# Patient Record
Sex: MALE | Race: WHITE | NOT HISPANIC OR LATINO | Employment: OTHER | URBAN - METROPOLITAN AREA
[De-identification: names, ages, dates, MRNs, and addresses within clinical notes are randomized per-mention and may not be internally consistent; named-entity substitution may affect disease eponyms.]

---

## 2018-07-12 ENCOUNTER — HOSPITAL ENCOUNTER (OUTPATIENT)
Dept: LAB | Facility: HOSPITAL | Age: 68
Discharge: HOME | End: 2018-07-12
Attending: INTERNAL MEDICINE | Admitting: INTERNAL MEDICINE

## 2018-07-12 LAB
ANION GAP SERPL CALC-SCNC: 11.2 MMOL/L (ref 10–20)
BASOPHILS # BLD AUTO: 0.1 10*3/UL (ref 0–0.2)
BASOPHILS NFR BLD AUTO: 1 % (ref 0–2)
BUN SERPL-MCNC: 20 MG/DL (ref 8–20)
BUN/CREAT SERPL: 20 (ref 6.2–20.3)
CALCIUM SERPL-MCNC: 9.8 MG/DL (ref 8.9–10.3)
CHLORIDE SERPL-SCNC: 106 MMOL/L (ref 101–111)
CHOLEST SERPL-MCNC: 163 MG/DL
CHOLEST/HDLC SERPL: 3.3 {RATIO}
CONV CO2: 27 MMOL/L (ref 22–32)
CONV LDL CHOLESTEROL DIRECT: 92 MG/DL (ref 0–100)
CREAT UR-MCNC: 1 MG/DL (ref 0.7–1.2)
DIFFERENTIAL METHOD BLD: (no result)
EOSINOPHIL # BLD AUTO: 0.1 10*3/UL (ref 0–0.3)
EOSINOPHIL # BLD AUTO: 2 % (ref 0–3)
ERYTHROCYTE [DISTWIDTH] IN BLOOD BY AUTOMATED COUNT: 12.7 % (ref 11.5–14.5)
GLUCOSE SERPL-MCNC: 123 MG/DL (ref 65–99)
HCT VFR BLD AUTO: 45.1 % (ref 40–54)
HDLC SERPL-MCNC: 49 MG/DL
HGB BLD-MCNC: 15 G/DL (ref 14–18)
LDLC/HDLC SERPL: 1.9 {RATIO}
LIPID INTERPRETATION: ABNORMAL
LYMPHOCYTES # BLD AUTO: 2.3 10*3/UL (ref 0.8–4.8)
LYMPHOCYTES NFR BLD AUTO: 28 % (ref 18–42)
MCH RBC QN AUTO: 28.4 PG (ref 26–32)
MCHC RBC AUTO-ENTMCNC: 33.2 G/DL (ref 32–36)
MCV RBC AUTO: 85.4 FL (ref 80–94)
MONOCYTES # BLD AUTO: 0.6 10*3/UL (ref 0.1–1.3)
MONOCYTES NFR BLD AUTO: 8 % (ref 2–11)
NEUTROPHILS # BLD AUTO: 4.9 10*3/UL (ref 2.3–8.6)
NEUTROPHILS NFR BLD AUTO: 61 % (ref 50–75)
NRBC BLD AUTO-RTO: 0 /100{WBCS}
NRBC/RBC NFR BLD MANUAL: 0 10*3/UL
PLATELET # BLD AUTO: 341 10*3/UL (ref 150–450)
PMV BLD AUTO: 7.8 FL (ref 7.4–10.4)
POTASSIUM SERPL-SCNC: 4.2 MMOL/L (ref 3.6–5.1)
RBC # BLD AUTO: 5.28 10*6/UL (ref 4.6–6)
SODIUM SERPL-SCNC: 140 MMOL/L (ref 136–144)
TRIGL SERPL-MCNC: 85 MG/DL
VLDLC SERPL CALC-MCNC: 21.7 MG/DL
WBC # BLD AUTO: 8 10*3/UL (ref 4.5–11.5)

## 2018-08-23 ENCOUNTER — HOSPITAL ENCOUNTER (OUTPATIENT)
Dept: LAB | Facility: HOSPITAL | Age: 68
Discharge: HOME | End: 2018-08-23
Attending: INTERNAL MEDICINE | Admitting: INTERNAL MEDICINE

## 2018-08-23 LAB
ANION GAP SERPL CALC-SCNC: 11 MMOL/L (ref 10–20)
BUN SERPL-MCNC: 12 MG/DL (ref 8–20)
BUN/CREAT SERPL: 10.9 (ref 6.2–20.3)
CALCIUM SERPL-MCNC: 9.5 MG/DL (ref 8.9–10.3)
CHLORIDE SERPL-SCNC: 101 MMOL/L (ref 101–111)
CONV CO2: 29 MMOL/L (ref 22–32)
CREAT UR-MCNC: 1.1 MG/DL (ref 0.7–1.2)
GLUCOSE SERPL-MCNC: 105 MG/DL (ref 65–99)
POTASSIUM SERPL-SCNC: 4 MMOL/L (ref 3.6–5.1)
SODIUM SERPL-SCNC: 137 MMOL/L (ref 136–144)

## 2019-05-21 PROBLEM — R01.1 HEART MURMUR: Status: ACTIVE | Noted: 2019-05-21

## 2019-05-21 PROBLEM — I10 HTN (HYPERTENSION): Status: ACTIVE | Noted: 2019-05-21

## 2019-05-21 PROBLEM — I25.10 CAD (CORONARY ARTERY DISEASE): Status: ACTIVE | Noted: 2019-05-21

## 2019-05-21 PROBLEM — E78.5 HLD (HYPERLIPIDEMIA): Status: ACTIVE | Noted: 2019-05-21

## 2019-05-23 DIAGNOSIS — E78.5 HYPERLIPIDEMIA, UNSPECIFIED HYPERLIPIDEMIA TYPE: ICD-10-CM

## 2019-05-23 DIAGNOSIS — I10 ESSENTIAL HYPERTENSION: Primary | ICD-10-CM

## 2019-05-23 RX ORDER — METOPROLOL TARTRATE 50 MG/1
50 TABLET, FILM COATED ORAL DAILY
Qty: 60 TABLET | Refills: 11 | Status: SHIPPED | OUTPATIENT
Start: 2019-05-23 | End: 2020-01-08

## 2019-05-23 RX ORDER — LISINOPRIL AND HYDROCHLOROTHIAZIDE 20; 12.5 MG/1; MG/1
1 TABLET ORAL DAILY
Qty: 30 TABLET | Refills: 5 | Status: SHIPPED | OUTPATIENT
Start: 2019-05-23 | End: 2020-04-15

## 2019-05-23 RX ORDER — ATORVASTATIN CALCIUM 40 MG/1
40 TABLET, FILM COATED ORAL DAILY
Qty: 30 TABLET | Refills: 11 | Status: SHIPPED | OUTPATIENT
Start: 2019-05-23 | End: 2020-07-15

## 2020-01-08 ENCOUNTER — OFFICE VISIT (OUTPATIENT)
Dept: CARDIOLOGY | Facility: CLINIC | Age: 70
End: 2020-01-08

## 2020-01-08 VITALS
HEART RATE: 75 BPM | HEIGHT: 68 IN | SYSTOLIC BLOOD PRESSURE: 120 MMHG | WEIGHT: 222.2 LBS | DIASTOLIC BLOOD PRESSURE: 78 MMHG | RESPIRATION RATE: 18 BRPM | BODY MASS INDEX: 33.68 KG/M2

## 2020-01-08 DIAGNOSIS — I25.10 CORONARY ARTERY DISEASE INVOLVING NATIVE CORONARY ARTERY OF NATIVE HEART WITHOUT ANGINA PECTORIS: Primary | ICD-10-CM

## 2020-01-08 DIAGNOSIS — I10 ESSENTIAL HYPERTENSION: ICD-10-CM

## 2020-01-08 DIAGNOSIS — E78.2 MIXED HYPERLIPIDEMIA: ICD-10-CM

## 2020-01-08 DIAGNOSIS — R01.1 HEART MURMUR: ICD-10-CM

## 2020-01-08 PROCEDURE — 99213 OFFICE O/P EST LOW 20 MIN: CPT | Performed by: INTERNAL MEDICINE

## 2020-01-08 RX ORDER — METOPROLOL SUCCINATE 50 MG/1
50 TABLET, EXTENDED RELEASE ORAL DAILY
COMMUNITY
End: 2020-01-24 | Stop reason: SDUPTHER

## 2020-01-10 NOTE — PROGRESS NOTES
[facility] cardiology  [facility] heart specialists  Rm Styles MD, PhD  Cardiology clinic note:  Subjective:     Encounter Date:01/08/2020      Patient ID: Oscar Nash is a 69 y.o. male.    Chief Complaint:  Chief Complaint   Patient presents with   • Follow-up       HPI:  History of Present Illness  I had the pleasure of seeing this 69-year-old patient who is well-known to me for 6-month follow-up.  Patient initially presented for typical anginal chest pain and dyspnea on exertion CCS class III and was taken to the Cath Lab for left heart cath and coronary angiography finding multivessel CAD ultimately getting 3 vessel coronary bypass graft with LIMA to LAD, SVG to OM1 and RCA and did well postoperatively.  He has preserved LV systolic function and presents for follow-up today with no issues.  He has had some weight gain over the holidays and is working on getting back in his exercise routine.  Blood pressure today well controlled 120/78 heart rate 75 with no anginal chest pain no dyspnea on exertion CCS class I NYHA class I.  No dizziness palpitations nausea vomiting diarrhea fevers chills sweats presyncopal symptoms or other complaints today.  He is on beta-blocker in addition to daily aspirin therapy high intensity statin therapy and lisinopril hydrochlorothiazide combination.  He is a non-smoker    Review of systems on 14 point review of systems negative except was mentioned above    Historical data copied forward from previous encounters and EMR is unchanged  The following portions of the patient's history were reviewed and updated as appropriate: allergies, current medications, past family history, past medical history, past social history, past surgical history and problem list.    Problem List:  Patient Active Problem List   Diagnosis   • HLD (hyperlipidemia)   • HTN (hypertension)   • Heart murmur   • CAD (coronary artery disease)       Past Medical History:  Past Medical  "History:   Diagnosis Date   • CAD (coronary artery disease)    • HLD (hyperlipidemia) 5/21/2019   • HTN (hypertension) 5/21/2019   • Hyperlipidemia    • Hypertension    • Murmur, heart        Past Surgical History:  Past Surgical History:   Procedure Laterality Date   • CORONARY ARTERY BYPASS GRAFT  07/2018    LIMA to LAD, SVGs to OM1 and RCA   • HERNIA REPAIR     • KNEE SURGERY Bilateral    • ROTATOR CUFF REPAIR Bilateral        Social History:  Social History     Socioeconomic History   • Marital status:      Spouse name: Not on file   • Number of children: Not on file   • Years of education: Not on file   • Highest education level: Not on file   Tobacco Use   • Smoking status: Never Smoker   Substance and Sexual Activity   • Alcohol use: Not Currently   • Drug use: Not Currently   • Sexual activity: Defer       Allergies:  Allergies   Allergen Reactions   • Penicillins Unknown (See Comments)     .         Immunizations:    There is no immunization history on file for this patient.    ROS:  ROS       Objective:         /78 (BP Location: Left arm, Patient Position: Sitting)   Pulse 75   Resp 18   Ht 172.7 cm (68\")   Wt 101 kg (222 lb 3.2 oz)   BMI 33.79 kg/m²     Physical Exam  No acute distress alert and oriented x3 afebrile vital signs stable  Regular rate and rhythm no rubs murmurs or gallops  No clubbing cyanosis or edema  Neck supple no carotid bruits no JVD  Obese soft nontender nondistended bowel sounds positive  Clear to auscultation bilaterally  No neurologic deficits grossly  Sternal incisions well-healed  Pulses 2+ distributions   No bony abnormalities grossly    In-Office Procedure(s):  Procedures    ASCVD RIsk Score::  The 10-year ASCVD risk score (Somisluz maria ARIAS Jr., et al., 2013) is: 13.7%    Values used to calculate the score:      Age: 69 years      Sex: Male      Is Non- : No      Diabetic: No      Tobacco smoker: No      Systolic Blood Pressure: 120 mmHg      Is " BP treated: Yes      HDL Cholesterol: 49 mg/dL      Total Cholesterol: 163 mg/dL    Recent Radiology:  Imaging Results (Most Recent)     None          Lab Review:   No visits with results within 6 Month(s) from this visit.   Latest known visit with results is:   No results found for any previous visit.                Assessment:          Diagnosis Plan   1. Coronary artery disease involving native coronary artery of native heart without angina pectoris     2. Heart murmur     3. Essential hypertension     4. Mixed hyperlipidemia            Plan:      Coronary artery disease multivessel status post three-vessel bypass 2018  LIMA to LAD, SVG to OM1 and RCA  Doing well anginal chest pain-free  Normal LV systolic function  No heart failure signs or symptoms  Clinically silent  Continue aspirin, high intensity statin beta-blocker along with antihypertensive therapy    Doing well, can follow with primary care Dr. Vik Fisher in the interim    Secondary prevention goals for CAD and comorbidities    Return to clinic in 1 year    Rm Styles MD, PhD           Rm Styles MD  01/10/20  .

## 2020-01-24 ENCOUNTER — TELEPHONE (OUTPATIENT)
Dept: CARDIOLOGY | Facility: CLINIC | Age: 70
End: 2020-01-24

## 2020-01-24 RX ORDER — METOPROLOL SUCCINATE 50 MG/1
50 TABLET, EXTENDED RELEASE ORAL DAILY
Qty: 90 TABLET | Refills: 1 | Status: SHIPPED | OUTPATIENT
Start: 2020-01-24 | End: 2020-07-14

## 2020-01-24 NOTE — TELEPHONE ENCOUNTER
"YANG @ [phone] CALLED FOR VERBAL FOR \"metoprolol succinate XL (TOPROL-XL) 50 MG 24 hr tablet\" PLEASE    [phone]  "

## 2020-04-15 DIAGNOSIS — I10 ESSENTIAL HYPERTENSION: ICD-10-CM

## 2020-04-15 RX ORDER — LISINOPRIL AND HYDROCHLOROTHIAZIDE 20; 12.5 MG/1; MG/1
TABLET ORAL
Qty: 30 TABLET | Refills: 0 | Status: SHIPPED | OUTPATIENT
Start: 2020-04-15 | End: 2020-04-20

## 2020-04-19 DIAGNOSIS — I10 ESSENTIAL HYPERTENSION: ICD-10-CM

## 2020-04-20 RX ORDER — LISINOPRIL AND HYDROCHLOROTHIAZIDE 20; 12.5 MG/1; MG/1
TABLET ORAL
Qty: 30 TABLET | Refills: 5 | Status: SHIPPED | OUTPATIENT
Start: 2020-04-20 | End: 2020-10-08

## 2020-07-14 RX ORDER — METOPROLOL SUCCINATE 50 MG/1
TABLET, EXTENDED RELEASE ORAL
Qty: 90 TABLET | Refills: 1 | Status: SHIPPED | OUTPATIENT
Start: 2020-07-14 | End: 2021-01-06

## 2020-07-15 DIAGNOSIS — E78.5 HYPERLIPIDEMIA, UNSPECIFIED HYPERLIPIDEMIA TYPE: ICD-10-CM

## 2020-07-15 RX ORDER — ATORVASTATIN CALCIUM 40 MG/1
TABLET, FILM COATED ORAL
Qty: 30 TABLET | Refills: 5 | Status: SHIPPED | OUTPATIENT
Start: 2020-07-15 | End: 2021-04-08

## 2020-09-24 ENCOUNTER — TELEPHONE (OUTPATIENT)
Dept: CARDIOLOGY | Facility: CLINIC | Age: 70
End: 2020-09-24

## 2020-09-24 NOTE — TELEPHONE ENCOUNTER
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

## 2020-10-08 DIAGNOSIS — I10 ESSENTIAL HYPERTENSION: ICD-10-CM

## 2020-10-08 RX ORDER — LISINOPRIL AND HYDROCHLOROTHIAZIDE 20; 12.5 MG/1; MG/1
TABLET ORAL
Qty: 30 TABLET | Refills: 5 | Status: SHIPPED | OUTPATIENT
Start: 2020-10-08 | End: 2021-04-08

## 2021-01-07 RX ORDER — METOPROLOL SUCCINATE 50 MG/1
TABLET, EXTENDED RELEASE ORAL
Qty: 90 TABLET | Refills: 2 | Status: SHIPPED | OUTPATIENT
Start: 2021-01-07 | End: 2021-09-27

## 2021-01-13 ENCOUNTER — TELEPHONE (OUTPATIENT)
Dept: CARDIOLOGY | Facility: CLINIC | Age: 71
End: 2021-01-13

## 2021-01-13 NOTE — TELEPHONE ENCOUNTER
"MCK  Verbal given to pharmacy for \"atorvastatin (LIPITOR) 40 MG tablet till appnt on 1/20/21     MS    "

## 2021-01-20 ENCOUNTER — OFFICE VISIT (OUTPATIENT)
Dept: CARDIOLOGY | Facility: CLINIC | Age: 71
End: 2021-01-20

## 2021-01-20 VITALS
HEIGHT: 68 IN | DIASTOLIC BLOOD PRESSURE: 70 MMHG | BODY MASS INDEX: 33.49 KG/M2 | SYSTOLIC BLOOD PRESSURE: 120 MMHG | RESPIRATION RATE: 18 BRPM | WEIGHT: 221 LBS

## 2021-01-20 DIAGNOSIS — I10 ESSENTIAL HYPERTENSION: ICD-10-CM

## 2021-01-20 DIAGNOSIS — E78.2 MIXED HYPERLIPIDEMIA: ICD-10-CM

## 2021-01-20 DIAGNOSIS — I25.10 CORONARY ARTERY DISEASE INVOLVING NATIVE CORONARY ARTERY OF NATIVE HEART WITHOUT ANGINA PECTORIS: Primary | ICD-10-CM

## 2021-01-20 PROCEDURE — 99214 OFFICE O/P EST MOD 30 MIN: CPT | Performed by: INTERNAL MEDICINE

## 2021-01-20 NOTE — PROGRESS NOTES
Cardiology clinic note  Rm Styles MD, PhD  Subjective:     Encounter Date:01/20/2021      Patient ID: Oscar Nash is a 70 y.o. male.    Chief Complaint:  Chief Complaint   Patient presents with   • Coronary Artery Disease   • Hypertension   • Hyperlipidemia       HPI:  History of Present Illness  History of Present Illness  I had the pleasure of seeing this 69-year-old patient who is well-known to me for 6-month follow-up.  Patient initially presented for typical anginal chest pain and dyspnea on exertion CCS class III and was taken to the Cath Lab for left heart cath and coronary angiography finding multivessel CAD ultimately getting 3 vessel coronary bypass graft with LIMA to LAD, SVG to OM1 and RCA and did well postoperatively.  He has preserved LV systolic function and presents for follow-up today with no issues.  He has had some weight gain over the holidays and is working on getting back in his exercise routine.  Blood pressure today well controlled 120/78 heart rate 75 with no anginal chest pain no dyspnea on exertion CCS class I NYHA class I.  No dizziness palpitations nausea vomiting diarrhea fevers chills sweats presyncopal symptoms or other complaints today.  He is on beta-blocker in addition to daily aspirin therapy high intensity statin therapy and lisinopril hydrochlorothiazide combination.  He is a non-smoker    Presents back to clinic today, weight is 220 pounds which we discussed diet weight loss, fasting sugar was high most recently primary care but he is not fasting at that time he says that he recently retook this to recheck fasting lipid and glucose levels.  We discussed prediabetes and what this means.  He is status post bypass and diagnosis of diabetes would be detrimental to longtime patency of the grafts as well as to overall health.  Blood pressures well controlled at 120/70, heart rates are 70s.  He is compliant with all medicines on aspirin, high intensity statin therapy and  beta-blocker with preserved LV systolic function and no heart failure signs or symptoms.    Essential hypertension  Multivessel coronary disease status post bypass 2018  Essential hypertension hyperlipidemia    Plan today  Continue aspirin statin beta-blocker  Follow-up with fasting sugars, diet and exercise regimen per AHA guidelines  Goal weight is less than 200 pounds with 10% weight loss over the next 6 months as discussed with the patient today  He can follow-up in 6 months to 1 year, primary care in the interim with goals established    Rm Styles MD, PhD    At the pleasure involved in his cardiovascular care.  Please call with any questions or concerns     Review of systems on 14 point review of systems negative except was mentioned above     Historical data copied forward from previous encounters and EMR is unchanged  The following portions of the patient's history were reviewed and updated as appropriate: allergies, current medications, past family history, past medical history, past social history, past surgical history and problem list.    Problem List:  Patient Active Problem List   Diagnosis   • HLD (hyperlipidemia)   • HTN (hypertension)   • Heart murmur   • CAD (coronary artery disease)       Past Medical History:  Past Medical History:   Diagnosis Date   • CAD (coronary artery disease)    • HLD (hyperlipidemia) 5/21/2019   • HTN (hypertension) 5/21/2019   • Hyperlipidemia    • Hypertension    • Murmur, heart        Past Surgical History:  Past Surgical History:   Procedure Laterality Date   • CORONARY ARTERY BYPASS GRAFT  07/2018    LIMA to LAD, SVGs to OM1 and RCA   • HERNIA REPAIR     • KNEE SURGERY Bilateral    • ROTATOR CUFF REPAIR Bilateral        Social History:  Social History     Socioeconomic History   • Marital status:      Spouse name: Not on file   • Number of children: Not on file   • Years of education: Not on file   • Highest education level: Not on file   Tobacco Use   •  "Smoking status: Never Smoker   • Smokeless tobacco: Never Used   Substance and Sexual Activity   • Alcohol use: Not Currently   • Drug use: Not Currently   • Sexual activity: Defer       Allergies:  Allergies   Allergen Reactions   • Penicillins Unknown (See Comments)     .         Immunizations:    There is no immunization history on file for this patient.    ROS:  ROS       Objective:         /70 (BP Location: Left arm, Patient Position: Sitting, Cuff Size: Large Adult)   Resp 18   Ht 172.7 cm (68\")   Wt 100 kg (221 lb)   BMI 33.60 kg/m²     Physical Exam    In-Office Procedure(s):  Procedures    ASCVD RIsk Score::  The 10-year ASCVD risk score (Fruitvaleluz maria ARIAS JrDino, et al., 2013) is: 14.8%    Values used to calculate the score:      Age: 70 years      Sex: Male      Is Non- : No      Diabetic: No      Tobacco smoker: No      Systolic Blood Pressure: 120 mmHg      Is BP treated: Yes      HDL Cholesterol: 49 mg/dL      Total Cholesterol: 163 mg/dL    Recent Radiology:  Imaging Results (Most Recent)     None          Lab Review:   No visits with results within 6 Month(s) from this visit.   Latest known visit with results is:   No results found for any previous visit.                Assessment:         No diagnosis found.       Plan:          As above     level of Care:                 Rm Styles MD  01/20/21  .  "

## 2021-03-15 ENCOUNTER — OFFICE VISIT (OUTPATIENT)
Dept: ENDOCRINOLOGY | Facility: CLINIC | Age: 71
End: 2021-03-15

## 2021-03-15 DIAGNOSIS — E11.65 UNCONTROLLED TYPE 2 DIABETES MELLITUS WITH HYPERGLYCEMIA (HCC): ICD-10-CM

## 2021-03-15 PROCEDURE — G0108 DIAB MANAGE TRN  PER INDIV: HCPCS | Performed by: DIETITIAN, REGISTERED

## 2021-04-07 DIAGNOSIS — I10 ESSENTIAL HYPERTENSION: ICD-10-CM

## 2021-04-07 DIAGNOSIS — E78.5 HYPERLIPIDEMIA, UNSPECIFIED HYPERLIPIDEMIA TYPE: ICD-10-CM

## 2021-04-08 RX ORDER — ATORVASTATIN CALCIUM 40 MG/1
TABLET, FILM COATED ORAL
Qty: 90 TABLET | Refills: 2 | Status: SHIPPED | OUTPATIENT
Start: 2021-04-08 | End: 2022-01-14

## 2021-04-08 RX ORDER — LISINOPRIL AND HYDROCHLOROTHIAZIDE 20; 12.5 MG/1; MG/1
TABLET ORAL
Qty: 90 TABLET | Refills: 2 | Status: SHIPPED | OUTPATIENT
Start: 2021-04-08 | End: 2022-01-14

## 2021-04-28 ENCOUNTER — OFFICE VISIT (OUTPATIENT)
Dept: ENDOCRINOLOGY | Facility: CLINIC | Age: 71
End: 2021-04-28

## 2021-04-28 DIAGNOSIS — E11.65 UNCONTROLLED TYPE 2 DIABETES MELLITUS WITH HYPERGLYCEMIA (HCC): ICD-10-CM

## 2021-04-28 PROCEDURE — G0109 DIAB MANAGE TRN IND/GROUP: HCPCS | Performed by: DIETITIAN, REGISTERED

## 2021-05-17 ENCOUNTER — TELEPHONE (OUTPATIENT)
Dept: ENDOCRINOLOGY | Facility: CLINIC | Age: 71
End: 2021-05-17

## 2021-05-17 ENCOUNTER — OFFICE VISIT (OUTPATIENT)
Dept: ENDOCRINOLOGY | Facility: CLINIC | Age: 71
End: 2021-05-17

## 2021-05-17 DIAGNOSIS — E11.65 UNCONTROLLED TYPE 2 DIABETES MELLITUS WITH HYPERGLYCEMIA (HCC): ICD-10-CM

## 2021-05-17 PROCEDURE — G0109 DIAB MANAGE TRN IND/GROUP: HCPCS | Performed by: DIETITIAN, REGISTERED

## 2021-09-27 RX ORDER — METOPROLOL SUCCINATE 50 MG/1
TABLET, EXTENDED RELEASE ORAL
Qty: 90 TABLET | Refills: 1 | Status: SHIPPED | OUTPATIENT
Start: 2021-09-27 | End: 2022-01-21

## 2022-01-14 DIAGNOSIS — I10 ESSENTIAL HYPERTENSION: ICD-10-CM

## 2022-01-14 DIAGNOSIS — E78.5 HYPERLIPIDEMIA, UNSPECIFIED HYPERLIPIDEMIA TYPE: ICD-10-CM

## 2022-01-14 RX ORDER — ATORVASTATIN CALCIUM 40 MG/1
TABLET, FILM COATED ORAL
Qty: 90 TABLET | Refills: 1 | Status: SHIPPED | OUTPATIENT
Start: 2022-01-14 | End: 2022-07-21 | Stop reason: SDUPTHER

## 2022-01-14 RX ORDER — LISINOPRIL AND HYDROCHLOROTHIAZIDE 20; 12.5 MG/1; MG/1
TABLET ORAL
Qty: 90 TABLET | Refills: 1 | Status: SHIPPED | OUTPATIENT
Start: 2022-01-14 | End: 2022-07-21 | Stop reason: SDUPTHER

## 2022-01-21 RX ORDER — METOPROLOL SUCCINATE 50 MG/1
TABLET, EXTENDED RELEASE ORAL
Qty: 90 TABLET | Refills: 0 | Status: SHIPPED | OUTPATIENT
Start: 2022-01-21 | End: 2022-07-21 | Stop reason: SDUPTHER

## 2022-04-27 ENCOUNTER — OFFICE VISIT (OUTPATIENT)
Dept: CARDIOLOGY | Facility: CLINIC | Age: 72
End: 2022-04-27

## 2022-04-27 VITALS
RESPIRATION RATE: 18 BRPM | DIASTOLIC BLOOD PRESSURE: 72 MMHG | SYSTOLIC BLOOD PRESSURE: 114 MMHG | HEIGHT: 68 IN | HEART RATE: 79 BPM | BODY MASS INDEX: 32.25 KG/M2 | WEIGHT: 212.8 LBS

## 2022-04-27 DIAGNOSIS — I35.0 NONRHEUMATIC AORTIC VALVE STENOSIS: Primary | ICD-10-CM

## 2022-04-27 PROCEDURE — 99214 OFFICE O/P EST MOD 30 MIN: CPT | Performed by: INTERNAL MEDICINE

## 2022-04-27 NOTE — PROGRESS NOTES
Cardiology Clinic Note  Rm Styles MD, PhD    Subjective:     Encounter Date:04/27/2022      Patient ID: Oscar Nash is a 71 y.o. male.    Chief Complaint:  Chief Complaint   Patient presents with   • Follow-up       HPI:      I had the pleasure of seeing this 69-year-old patient who is well-known to me for 6-month follow-up.  Patient initially presented for typical anginal chest pain and dyspnea on exertion CCS class III and was taken to the Cath Lab for left heart cath and coronary angiography finding multivessel CAD ultimately getting 3 vessel coronary bypass graft with LIMA to LAD, SVG to OM1 and RCA and did well postoperatively.  He has preserved LV systolic function and presents for follow-up today with no issues.  He has had some weight gain over the holidays and is working on getting back in his exercise routine.  Blood pressure today well controlled 120/78 heart rate 75 with no anginal chest pain no dyspnea on exertion CCS class I NYHA class I.  No dizziness palpitations nausea vomiting diarrhea fevers chills sweats presyncopal symptoms or other complaints today.  He is on beta-blocker in addition to daily aspirin therapy high intensity statin therapy and lisinopril hydrochlorothiazide combination.  He is a non-smoker     Presents back to clinic today, weight is down to 212 from 220 pounds which is good.  Blood pressure 114/72 heart rates in the 70s.  No recurrent anginal chest pain no heart failure signs or symptoms.  s.  He is status post bypass 2018 nearly 5 years ago, he is been diagnosed with prediabetes and we did discuss weight reduction and glucose control to avoid progression. He is compliant with all medicines on aspirin, high intensity statin therapy and beta-blocker with preserved LV systolic function and no heart failure signs or symptoms.  Doing well     Essential hypertension  Multivessel coronary disease status post bypass 2018  Essential hypertension hyperlipidemia     Plan  "today  Continue aspirin statin beta-blocker  Follow-up with fasting sugars, diet and exercise regimen per AHA guidelines  Goal weight is less than 200 pounds with 10% weight loss over the next 6 months as discussed with the patient today  He can follow-up in 6 months to 1 year, primary care in the interim with goals established  Murmur on exam consistent with mild to moderate aortic valve stenosis but has not had an echo in over 3 to 4 years for reevaluation  Get treadmill stress as well nearly 5 years after bypass surgery, will combine echo for stress echo in 1 year per guidelines    Continue present cardio medicines    Normal exam today  Sternal clean and dry  Vitals reviewed  Regular rate and rhythm no rubs gallops heave or lift, 1/2/2006 systolic ejection murmur lower sternal border crescendo consistent with aortic stenosis  No clubbing cyanosis or edema  Intact grossly  Soft nontender nondistended  Clear to auscultation         Rm Styles MD, PhD       Historical data copied forward from previous encounters in EMR including the history, exam, and assessment/plan has been reviewed and is unchanged unless noted otherwise.    Cardiac medicines reviewed with risk, benefits, and necessity of each discussed.    Risk and benefit of cardiac testing reviewed including death heart attack stroke pain bleeding infection need for vascular /cardiovascular surgery were discussed and the patient     Objective:         /72 (BP Location: Left arm, Patient Position: Sitting)   Pulse 79   Resp 18   Ht 172.7 cm (68\")   Wt 96.5 kg (212 lb 12.8 oz)   BMI 32.36 kg/m²     Physical Exam    Assessment:         Diagnoses and all orders for this visit:    1. Nonrheumatic aortic valve stenosis (Primary)  -     Adult Transthoracic Echo Complete W/ Cont if Necessary Per Protocol; Future           Plan:              The pleasure to be involved in this patient's cardiovascular care.  Please call with any questions or " concerns  mR Styles MD, PhD    Most recent EKG as reviewed and interpreted by me:  Procedures     Most recent echo as reviewed and interpreted by me:      Most recent stress test as reviewed and interpreted by me:      Most recent cardiac catheterization as reviewed interpreted by me:  No results found for this or any previous visit.    The following portions of the patient's history were reviewed and updated as appropriate: allergies, current medications, past family history, past medical history, past social history, past surgical history and problem list.      ROS:  14 point review of systems negative except as mentioned above    Current Outpatient Medications:   •  aspirin 81 MG EC tablet, Take 81 mg by mouth Daily., Disp: , Rfl:   •  atorvastatin (LIPITOR) 40 MG tablet, TAKE 1 TABLET BY MOUTH EVERY DAY, Disp: 90 tablet, Rfl: 1  •  lisinopril-hydrochlorothiazide (PRINZIDE,ZESTORETIC) 20-12.5 MG per tablet, TAKE 1 TABLET BY MOUTH EVERY DAY, Disp: 90 tablet, Rfl: 1  •  metoprolol succinate XL (TOPROL-XL) 50 MG 24 hr tablet, TAKE 1 TABLET BY MOUTH EVERY DAY, Disp: 90 tablet, Rfl: 0    Problem List:  Patient Active Problem List   Diagnosis   • HLD (hyperlipidemia)   • HTN (hypertension)   • Heart murmur   • CAD (coronary artery disease)     Past Medical History:  Past Medical History:   Diagnosis Date   • CAD (coronary artery disease)    • HLD (hyperlipidemia) 5/21/2019   • HTN (hypertension) 5/21/2019   • Hyperlipidemia    • Hypertension    • Murmur, heart      Past Surgical History:  Past Surgical History:   Procedure Laterality Date   • CORONARY ARTERY BYPASS GRAFT  07/2018    LIMA to LAD, SVGs to OM1 and RCA   • HERNIA REPAIR     • KNEE SURGERY Bilateral    • ROTATOR CUFF REPAIR Bilateral      Social History:  Social History     Socioeconomic History   • Marital status:    Tobacco Use   • Smoking status: Never Smoker   • Smokeless tobacco: Never Used   Substance and Sexual Activity   • Alcohol use:  Not Currently   • Drug use: Not Currently   • Sexual activity: Defer     Allergies:  Allergies   Allergen Reactions   • Penicillins Unknown (See Comments) and Rash     .       Immunizations:  Immunization History   Administered Date(s) Administered   • FLUAD TRI 65YR+ 10/12/2019   • Flu Vaccine Quad PF >36MO 09/27/2016   • Fluad Quad 65+ 09/28/2020   • Hepatitis A 04/30/2018, 05/18/2019   • Influenza, Unspecified 01/13/2013, 09/24/2015, 10/27/2016, 09/07/2017, 09/22/2018, 10/13/2019   • Pneumococcal Conjugate 13-Valent (PCV13) 09/22/2018   • Pneumococcal Polysaccharide (PPSV23) 10/12/2019, 10/13/2019   • Shingrix 12/04/2020   • Tdap 12/31/2014, 12/28/2015   • Zostavax 08/31/2016, 09/27/2016            In-Office Procedure(s):  No orders to display        ASCVD RIsk Score::  The ASCVD Risk score (Lamin DC Jr., et al., 2013) failed to calculate for the following reasons:    Cannot find a previous HDL lab    Cannot find a previous total cholesterol lab    Imaging:                 Lab Review:   No visits with results within 6 Month(s) from this visit.   Latest known visit with results is:   No results found for any previous visit.     Recent labs reviewed and interpreted for clinical significance and application            Level of Care:           Rm Styles MD  04/27/22  .

## 2022-05-05 ENCOUNTER — TELEPHONE (OUTPATIENT)
Dept: CARDIOLOGY | Facility: CLINIC | Age: 72
End: 2022-05-05

## 2022-05-05 NOTE — TELEPHONE ENCOUNTER
Patient called wanting to make sure its ok for him to take meloxicam? His orthopedic doctor is wanting him to try non surgical options before knee surgery. He is going to try this for 30 days and the longest he will be on this 90 days he says.

## 2022-07-21 DIAGNOSIS — I10 ESSENTIAL HYPERTENSION: ICD-10-CM

## 2022-07-21 DIAGNOSIS — E78.5 HYPERLIPIDEMIA, UNSPECIFIED HYPERLIPIDEMIA TYPE: ICD-10-CM

## 2022-07-21 RX ORDER — METOPROLOL SUCCINATE 50 MG/1
50 TABLET, EXTENDED RELEASE ORAL DAILY
Qty: 90 TABLET | Refills: 3 | Status: SHIPPED | OUTPATIENT
Start: 2022-07-21

## 2022-07-21 RX ORDER — LISINOPRIL AND HYDROCHLOROTHIAZIDE 20; 12.5 MG/1; MG/1
1 TABLET ORAL DAILY
Qty: 90 TABLET | Refills: 3 | Status: SHIPPED | OUTPATIENT
Start: 2022-07-21 | End: 2023-02-07

## 2022-07-21 RX ORDER — ATORVASTATIN CALCIUM 40 MG/1
40 TABLET, FILM COATED ORAL DAILY
Qty: 90 TABLET | Refills: 3 | Status: SHIPPED | OUTPATIENT
Start: 2022-07-21

## 2022-09-14 ENCOUNTER — APPOINTMENT (OUTPATIENT)
Dept: CT IMAGING | Facility: HOSPITAL | Age: 72
End: 2022-09-14

## 2022-09-14 ENCOUNTER — HOSPITAL ENCOUNTER (EMERGENCY)
Facility: HOSPITAL | Age: 72
Discharge: HOME | End: 2022-09-14
Attending: EMERGENCY MEDICINE | Admitting: EMERGENCY MEDICINE

## 2022-09-14 VITALS
WEIGHT: 208 LBS | HEIGHT: 68 IN | BODY MASS INDEX: 31.52 KG/M2 | HEART RATE: 81 BPM | OXYGEN SATURATION: 98 % | DIASTOLIC BLOOD PRESSURE: 78 MMHG | TEMPERATURE: 98 F | RESPIRATION RATE: 18 BRPM | SYSTOLIC BLOOD PRESSURE: 139 MMHG

## 2022-09-14 DIAGNOSIS — R59.0 LYMPHADENOPATHY OF RIGHT CERVICAL REGION: Primary | ICD-10-CM

## 2022-09-14 LAB
ANION GAP SERPL CALCULATED.3IONS-SCNC: 10 MMOL/L (ref 5–15)
BASOPHILS # BLD AUTO: 0.04 10*3/MM3 (ref 0–0.2)
BASOPHILS NFR BLD AUTO: 0.7 % (ref 0–1.5)
BUN SERPL-MCNC: 12 MG/DL (ref 8–23)
BUN/CREAT SERPL: 11.1 (ref 7–25)
CALCIUM SPEC-SCNC: ABNORMAL MMOL/L
CHLORIDE SERPL-SCNC: 103 MMOL/L (ref 98–107)
CO2 SERPL-SCNC: 28 MMOL/L (ref 22–29)
CREAT BLDA-MCNC: 1.08 MG/DL
CREAT SERPL-MCNC: 1.08 MG/DL (ref 0.76–1.27)
DEPRECATED RDW RBC AUTO: 38.7 FL (ref 37–54)
EGFRCR SERPLBLD CKD-EPI 2021: 74.3 ML/MIN/1.73
EGFRCR SERPLBLD CKD-EPI 2021: 74.3 ML/MIN/1.73
EOSINOPHIL # BLD AUTO: 0.31 10*3/MM3 (ref 0–0.4)
EOSINOPHIL NFR BLD AUTO: 5.3 % (ref 0.3–6.2)
ERYTHROCYTE [DISTWIDTH] IN BLOOD BY AUTOMATED COUNT: 12.6 % (ref 12.3–15.4)
GLUCOSE SERPL-MCNC: 128 MG/DL (ref 65–99)
HCT VFR BLD AUTO: 43.2 % (ref 37.5–51)
HGB BLD-MCNC: 14.3 G/DL (ref 13–17.7)
IMM GRANULOCYTES # BLD AUTO: 0.07 10*3/MM3 (ref 0–0.05)
IMM GRANULOCYTES NFR BLD AUTO: 1.2 % (ref 0–0.5)
LYMPHOCYTES # BLD AUTO: 1.14 10*3/MM3 (ref 0.7–3.1)
LYMPHOCYTES NFR BLD AUTO: 19.6 % (ref 19.6–45.3)
MCH RBC QN AUTO: 27.7 PG (ref 26.6–33)
MCHC RBC AUTO-ENTMCNC: 33.1 G/DL (ref 31.5–35.7)
MCV RBC AUTO: 83.7 FL (ref 79–97)
MONOCYTES # BLD AUTO: 0.74 10*3/MM3 (ref 0.1–0.9)
MONOCYTES NFR BLD AUTO: 12.7 % (ref 5–12)
NEUTROPHILS NFR BLD AUTO: 3.52 10*3/MM3 (ref 1.7–7)
NEUTROPHILS NFR BLD AUTO: 60.5 % (ref 42.7–76)
PLATELET # BLD AUTO: 310 10*3/MM3 (ref 140–450)
PMV BLD AUTO: 9.3 FL (ref 6–12)
POTASSIUM SERPL-SCNC: 4.4 MMOL/L (ref 3.5–5.2)
RBC # BLD AUTO: 5.16 10*6/MM3 (ref 4.14–5.8)
SODIUM SERPL-SCNC: 141 MMOL/L (ref 136–145)
WBC NRBC COR # BLD: 5.82 10*3/MM3 (ref 3.4–10.8)

## 2022-09-14 PROCEDURE — 99283 EMERGENCY DEPT VISIT LOW MDM: CPT | Performed by: EMERGENCY MEDICINE

## 2022-09-14 PROCEDURE — 85025 COMPLETE CBC W/AUTO DIFF WBC: CPT | Performed by: EMERGENCY MEDICINE

## 2022-09-14 PROCEDURE — 70491 CT SOFT TISSUE NECK W/DYE: CPT

## 2022-09-14 PROCEDURE — 80048 BASIC METABOLIC PNL TOTAL CA: CPT | Performed by: EMERGENCY MEDICINE

## 2022-09-14 PROCEDURE — 99283 EMERGENCY DEPT VISIT LOW MDM: CPT

## 2022-09-14 PROCEDURE — 0 IOPAMIDOL PER 1 ML: Performed by: EMERGENCY MEDICINE

## 2022-09-14 PROCEDURE — 82565 ASSAY OF CREATININE: CPT

## 2022-09-14 RX ADMIN — IOPAMIDOL 100 ML: 755 INJECTION, SOLUTION INTRAVENOUS at 11:50

## 2022-09-15 NOTE — ED PROVIDER NOTES
Subjective   History of Present Illness  Chief complaint: Mass right neck    HPI: 71-year-old male presents with a painless palpable mass in his right neck.  It is posterior to the sternocleidomastoid muscles.  Says he just noticed this morning when shaving.  Said no pain.  He had no fever, chills, no night sweats no weight loss.        Review of Systems   Constitutional: Negative for activity change, chills, diaphoresis, fatigue, fever and unexpected weight change.   HENT: Negative for facial swelling.    Respiratory: Negative for cough, choking and shortness of breath.    Endocrine: Negative for cold intolerance, heat intolerance, polydipsia and polyphagia.   Genitourinary: Negative for difficulty urinating.   Musculoskeletal: Negative for joint swelling, myalgias, neck pain and neck stiffness.   Skin: Negative for color change.       Past Medical History:   Diagnosis Date   • CAD (coronary artery disease)    • HLD (hyperlipidemia) 5/21/2019   • HTN (hypertension) 5/21/2019   • Hyperlipidemia    • Hypertension    • Murmur, heart        Allergies   Allergen Reactions   • Penicillins Unknown (See Comments) and Rash     .         Past Surgical History:   Procedure Laterality Date   • CORONARY ARTERY BYPASS GRAFT  07/2018    LIMA to LAD, SVGs to OM1 and RCA   • HERNIA REPAIR     • KNEE SURGERY Bilateral    • ROTATOR CUFF REPAIR Bilateral        Family History   Problem Relation Age of Onset   • Coronary artery disease Father        Social History     Socioeconomic History   • Marital status:    Tobacco Use   • Smoking status: Never Smoker   • Smokeless tobacco: Never Used   Substance and Sexual Activity   • Alcohol use: Not Currently   • Drug use: Not Currently   • Sexual activity: Defer           Objective   Physical Exam  Vitals and nursing note reviewed.   Constitutional:       Appearance: Normal appearance.   HENT:      Head: Normocephalic.      Right Ear: Tympanic membrane normal.      Mouth/Throat:       Mouth: Mucous membranes are moist.   Eyes:      Pupils: Pupils are equal, round, and reactive to light.   Neck:      Comments: Examination of his neck he has a large palpable mass which is firm and nontender is not mobile, it feels the size of a lime  Cardiovascular:      Rate and Rhythm: Normal rate and regular rhythm.   Pulmonary:      Effort: Pulmonary effort is normal.   Abdominal:      General: Abdomen is flat.   Musculoskeletal:         General: Normal range of motion.      Cervical back: Normal range of motion and neck supple. Tenderness present. No rigidity.   Skin:     General: Skin is warm and dry.      Capillary Refill: Capillary refill takes less than 2 seconds.   Neurological:      General: No focal deficit present.      Mental Status: He is alert.   Psychiatric:         Mood and Affect: Mood normal.         Procedures           ED Course      CT Soft Tissue Neck With Contrast   Final Result       1.  Multiple enlarged cervical and supraclavicular lymph nodes and upper   anterior mediastinal nodes.  Differential considerations would include   metastatic disease or lymphoma.  No definite primary neoplasm identified   within the neck.  Consider PET CT scan or CT scan of the chest, abdomen,   and pelvis to evaluate for primary neoplasm.  Alternatively percutaneous   biopsy could be performed of one of these enlarged lymph nodes.       Electronically Signed By-Neri Torres MD On:9/14/2022 12:04 PM   This report was finalized on [id number] by  Neri Torres MD.                                             MDM CT is very concerning for the possibility of neoplastic process.  I paged our on-call oncology group twice with no return over 2 and half hours.  I did contact his primary doctor Dr. Fisher, I have relayed a message to the nursing staff in his office of the patient's CT results and the need for follow-up.  At the very least he needs a referral to an oncologist, biopsy this tissue or a PET scan.  I  discussed the need for these follow-up studies with the patient and the importance of getting done soon.    Final diagnoses:   Lymphadenopathy of right cervical region       ED Disposition  ED Disposition     ED Disposition   Discharge    Condition   Stable    Comment   --             Vik Fisher MD  [address] [place] [address]  [phone]               Medication List      No changes were made to your prescriptions during this visit.     09:40 EDT 09/15/2022 -I discussed him this case with the oncologist and they will call him today to make a follow-up appointment        Dakota Manuel MD  09/15/22 [id number]

## 2022-09-26 ENCOUNTER — HOSPITAL ENCOUNTER (OUTPATIENT)
Dept: INTERVENTIONAL RADIOLOGY/VASCULAR | Facility: HOSPITAL | Age: 72
Discharge: HOME | End: 2022-09-26
Admitting: INTERNAL MEDICINE

## 2022-09-26 VITALS — WEIGHT: 212 LBS | HEIGHT: 68 IN | BODY MASS INDEX: 32.13 KG/M2

## 2022-09-26 DIAGNOSIS — R59.1 LYMPHADENOPATHY: ICD-10-CM

## 2022-09-26 PROCEDURE — 88333 PATH CONSLTJ SURG CYTO XM 1: CPT | Performed by: INTERNAL MEDICINE

## 2022-09-26 PROCEDURE — 76942 ECHO GUIDE FOR BIOPSY: CPT

## 2022-09-26 PROCEDURE — 88305 TISSUE EXAM BY PATHOLOGIST: CPT | Performed by: INTERNAL MEDICINE

## 2022-09-26 RX ORDER — LIDOCAINE HYDROCHLORIDE 20 MG/ML
INJECTION, SOLUTION INFILTRATION; PERINEURAL
Status: COMPLETED | OUTPATIENT
Start: 2022-09-26 | End: 2022-09-26

## 2022-09-26 RX ADMIN — LIDOCAINE HYDROCHLORIDE 7 ML: 20 INJECTION, SOLUTION INFILTRATION; PERINEURAL at 14:44

## 2022-09-26 NOTE — NURSING NOTE
Pt was educated on his discharge instructions and voiced understanding; paper copy provided to patient. Pt was then discharged from IR dept to home in stable condition on room air and ambulated independently with his wife from dept.

## 2022-09-26 NOTE — NURSING NOTE
Pathologist arrived to IR lab D and Dr. Bird obtained initial core biopsy sample for pathologist.

## 2022-09-26 NOTE — NURSING NOTE
Dr. Bird is obtaining additional core lymph node biopsy samples for pathologist. See MD dictation for procedural specific information.

## 2022-09-26 NOTE — NURSING NOTE
Dr. Bird numbed area and is placing biopsy needle using ultrasound guidance for a right supraclavicular/neck lymph node biopsy.

## 2022-09-26 NOTE — NURSING NOTE
Hemostasis achieved and local dressing of bacitracin, 2x2, and tegaderm applied to right neck. Dressing is dry and intact. Local ice pack applied to site.

## 2022-09-26 NOTE — NURSING NOTE
Right neck and chest prepped in sterile fashion, after Dr. Bird marked area for biopsy using ultrasound guidance.

## 2022-09-26 NOTE — DISCHARGE INSTRUCTIONS
Stay seated upright for the next 4-6 hours to prevent bleeding. Keep dressing clean and dry for the next 48 hours. You may bathe area when it is completely healed closed, after the 48 hour bandage is removed.   No strenuous activity or heavy lifting greater than 10 lbs. for the next 48 hours. Watch for signs and symptoms of infection such as fever over 101 degrees, redness/swelling around area, yellow/green drainage, etc...  Follow up with your physician as needed and for your biopsy results either later this week or next week.    You may resume your aspirin tomorrow on 9/27/22, per Dr. Bird.     Any concerns, you can reach us at [phone], Mon-Fri, from 8am-5pm.

## 2022-09-26 NOTE — NURSING NOTE
Pt ambulated independently into IR lab D, with RN, and placed self onto stretcher into supine position. Pt provided warm blankets for comfort.

## 2022-09-27 LAB — Lab: NORMAL

## 2022-09-30 LAB
LAB AP CASE REPORT: NORMAL
LAB AP DIAGNOSIS COMMENT: NORMAL
PATH REPORT.FINAL DX SPEC: NORMAL
PATH REPORT.GROSS SPEC: NORMAL

## 2023-02-02 ENCOUNTER — TRANSCRIBE ORDERS (OUTPATIENT)
Dept: ADMINISTRATIVE | Facility: HOSPITAL | Age: 73
End: 2023-02-02
Payer: MEDICARE

## 2023-02-02 ENCOUNTER — HOSPITAL ENCOUNTER (OUTPATIENT)
Dept: CARDIOLOGY | Facility: HOSPITAL | Age: 73
Discharge: HOME | End: 2023-02-02
Admitting: NURSE PRACTITIONER
Payer: MEDICARE

## 2023-02-02 DIAGNOSIS — R00.0 TACHYCARDIA: Primary | ICD-10-CM

## 2023-02-02 DIAGNOSIS — R00.0 TACHYCARDIA: ICD-10-CM

## 2023-02-02 LAB — QT INTERVAL: 344 MS

## 2023-02-02 PROCEDURE — 93010 ELECTROCARDIOGRAM REPORT: CPT | Performed by: INTERNAL MEDICINE

## 2023-02-02 PROCEDURE — 93005 ELECTROCARDIOGRAM TRACING: CPT | Performed by: NURSE PRACTITIONER

## 2023-02-03 ENCOUNTER — TELEPHONE (OUTPATIENT)
Dept: CARDIOLOGY | Facility: CLINIC | Age: 73
End: 2023-02-03

## 2023-02-03 NOTE — TELEPHONE ENCOUNTER
Caller: GABBY    Relationship: Child    Best call back number: [phone]    What is the best time to reach you: ANY    Who are you requesting to speak with (clinical staff, provider,  specific staff member): ANY    Do you require a callback: YES     PT HAS BEEN EXPERIENCING LOW BP BUT HIS HR IS PRETTY HIGH.  HE HAS BEEN HOLDING OFF ON HIS LISINOPRIL 20-12.5 MG FOR ABOUT 2 WEEKS BUT HE IS STILL TAKING HIS METOPROLOL 50 MG. PT HAS APPT ON 4.27.23 BUT IS NEEDING TO BE SEEN SOONER.    HIGHEST HR BEING  AS OF RN.

## 2023-02-06 ENCOUNTER — OFFICE VISIT (OUTPATIENT)
Dept: CARDIOLOGY | Facility: CLINIC | Age: 73
End: 2023-02-06
Payer: MEDICARE

## 2023-02-06 VITALS
BODY MASS INDEX: 26.98 KG/M2 | SYSTOLIC BLOOD PRESSURE: 108 MMHG | DIASTOLIC BLOOD PRESSURE: 76 MMHG | HEART RATE: 134 BPM | RESPIRATION RATE: 18 BRPM | WEIGHT: 178 LBS | HEIGHT: 68 IN

## 2023-02-06 DIAGNOSIS — I25.118 CORONARY ARTERY DISEASE OF NATIVE ARTERY OF NATIVE HEART WITH STABLE ANGINA PECTORIS: ICD-10-CM

## 2023-02-06 DIAGNOSIS — I10 PRIMARY HYPERTENSION: ICD-10-CM

## 2023-02-06 DIAGNOSIS — I35.0 NONRHEUMATIC AORTIC VALVE STENOSIS: ICD-10-CM

## 2023-02-06 DIAGNOSIS — R00.0 TACHYCARDIA: Primary | ICD-10-CM

## 2023-02-06 DIAGNOSIS — I25.10 CORONARY ARTERY DISEASE INVOLVING NATIVE CORONARY ARTERY OF NATIVE HEART WITHOUT ANGINA PECTORIS: ICD-10-CM

## 2023-02-06 DIAGNOSIS — E78.5 HYPERLIPIDEMIA, UNSPECIFIED HYPERLIPIDEMIA TYPE: ICD-10-CM

## 2023-02-06 DIAGNOSIS — I10 ESSENTIAL HYPERTENSION: ICD-10-CM

## 2023-02-06 PROCEDURE — 99214 OFFICE O/P EST MOD 30 MIN: CPT | Performed by: INTERNAL MEDICINE

## 2023-02-06 RX ORDER — POTASSIUM CHLORIDE 750 MG/1
CAPSULE, EXTENDED RELEASE ORAL DAILY
COMMUNITY
Start: 2023-02-02

## 2023-02-06 RX ORDER — ALBUTEROL SULFATE 90 UG/1
AEROSOL, METERED RESPIRATORY (INHALATION) AS NEEDED
COMMUNITY
Start: 2023-02-02

## 2023-02-07 RX ORDER — MIDODRINE HYDROCHLORIDE 5 MG/1
5 TABLET ORAL
Qty: 90 TABLET | Refills: 5 | Status: SHIPPED | OUTPATIENT
Start: 2023-02-07

## 2023-02-16 NOTE — PROGRESS NOTES
Cardiology Clinic Note  Rm Styles MD, PhD    Subjective:     Encounter Date:02/06/2023      Patient ID: Oscar Nash is a 72 y.o. male.    Chief Complaint:  Chief Complaint   Patient presents with   • Follow-up       HPI:       I had the pleasure of seeing this 72-year-old patient who is well-known to me for 6-month follow-up.  2018 patient initially presented for typical anginal chest pain and dyspnea on exertion CCS class III and was taken to the Cath Lab for left heart cath and coronary angiography finding multivessel CAD ultimately getting 3 vessel coronary bypass graft with LIMA to LAD, SVG to OM1 and RCA and did well postoperatively.  He presents back to clinic today, weight is down almost 30 pounds, he was diagnosed with Hodgkin's lymphoma ultimately getting chemotherapy treatments.  He is very tired, blood pressures are much lower and he is off lisinopril HCTZ only on metoprolol.  He has dizziness episodes and signs of volume depletion with poor p.o. intake, no coronary symptomatology such as chest pain, he does have dyspnea on exertion.   He is status post bypass 2018 nearly 6 years ago, He is compliant with all medicines on aspirin, high intensity statin therapy and beta-blocker with preserved LV systolic function and no heart failure signs or symptoms.   He is very weak today    Review of systems negative except was mentioned above x14 point review of systems  Historical data copied forward from previous encounters in EMR is unchanged        Normal exam today  Sternal clean and dry  Vitals reviewed  Regular rate and rhythm no rubs gallops heave or lift, 1/2/2006 systolic ejection murmur lower sternal border crescendo consistent with aortic stenosis  No clubbing cyanosis or edema  Intact grossly  Soft nontender nondistended  Clear to auscultation     Essential hypertension  Multivessel coronary disease status post bypass 2018  Essential hypertension hyperlipidemia  Hodgkin's  "lymphoma  Hypotension  Recent chemotherapy  Mild to moderate aortic valve stenosis      Plan today  Continue aspirin statin beta-blocker  Off lisinopril HCTZ with hypotension  Midodrine 5 mg 3 times a day as needed  Encourage p.o. intake, hydration, electrolyte rich solutions and fluids    diet and exercise regimen per AHA guidelines    Murmur on exam consistent with mild to moderate aortic valve stenosis but has not had an echo in over 3 to 4 years for reevaluation    Avoid stress testing at this point, we will pursue treadmill stress testing at a later date for risk stratification greater than 5 years after bypass now.     Continue present cardio medicines        Rm Styles MD, PhD         Objective:         /76 (BP Location: Right arm, Patient Position: Sitting)   Pulse (!) 134   Resp 18   Ht 172.7 cm (68\")   Wt 80.7 kg (178 lb)   BMI 27.06 kg/m²       The pleasure to be involved in this patient's cardiovascular care.  Please call with any questions or concerns  Rm Styles MD, PhD    Most recent EKG as reviewed and interpreted by me:  Procedures     Most recent echo as reviewed and interpreted by me:      Most recent stress test as reviewed and interpreted by me:      Most recent cardiac catheterization as reviewed interpreted by me:  No results found for this or any previous visit.    The following portions of the patient's history were reviewed and updated as appropriate: allergies, current medications, past family history, past medical history, past social history, past surgical history and problem list.      ROS:  14 point review of systems negative except as mentioned above    Current Outpatient Medications:   •  albuterol sulfate  (90 Base) MCG/ACT inhaler, As Needed., Disp: , Rfl:   •  aspirin 81 MG EC tablet, Take 81 mg by mouth Daily., Disp: , Rfl:   •  atorvastatin (LIPITOR) 40 MG tablet, Take 1 tablet by mouth Daily., Disp: 90 tablet, Rfl: 3  •  metoprolol succinate XL " (TOPROL-XL) 50 MG 24 hr tablet, Take 1 tablet by mouth Daily., Disp: 90 tablet, Rfl: 3  •  potassium chloride (MICRO-K) 10 MEQ CR capsule, Daily., Disp: , Rfl:   •  midodrine (PROAMATINE) 5 MG tablet, Take 1 tablet by mouth 3 (Three) Times a Day Before Meals., Disp: 90 tablet, Rfl: 5    Problem List:  Patient Active Problem List   Diagnosis   • HLD (hyperlipidemia)   • HTN (hypertension)   • Heart murmur   • CAD (coronary artery disease)     Past Medical History:  Past Medical History:   Diagnosis Date   • CAD (coronary artery disease)    • HLD (hyperlipidemia) 5/21/2019   • HTN (hypertension) 5/21/2019   • Hyperlipidemia    • Hypertension    • Murmur, heart      Past Surgical History:  Past Surgical History:   Procedure Laterality Date   • CORONARY ARTERY BYPASS GRAFT  07/2018    LIMA to LAD, SVGs to OM1 and RCA   • HERNIA REPAIR     • KNEE SURGERY Bilateral    • ROTATOR CUFF REPAIR Bilateral    • US GUIDED LYMPH NODE BIOPSY  9/26/2022     Social History:  Social History     Socioeconomic History   • Marital status:    Tobacco Use   • Smoking status: Never   • Smokeless tobacco: Never   Substance and Sexual Activity   • Alcohol use: Not Currently   • Drug use: Not Currently   • Sexual activity: Defer     Allergies:  Allergies   Allergen Reactions   • Penicillins Unknown (See Comments) and Rash     .       Immunizations:  Immunization History   Administered Date(s) Administered   • FLUAD TRI 65YR+ 10/12/2019   • Flu Vaccine Quad PF >36MO 09/27/2016   • Fluad Quad 65+ 09/28/2020   • Hepatitis A 04/30/2018, 05/18/2019   • Influenza, Unspecified 01/13/2013, 09/24/2015, 10/27/2016, 09/07/2017, 09/22/2018, 10/13/2019   • Pneumococcal Conjugate 13-Valent (PCV13) 09/22/2018   • Pneumococcal Polysaccharide (PPSV23) 10/12/2019, 10/13/2019   • Shingrix 12/04/2020   • Tdap 12/31/2014, 12/28/2015   • Zostavax 08/31/2016, 09/27/2016            In-Office Procedure(s):  No orders to display        ASCVD RIsk Score::  The  ASCVD Risk score (Moy PARDO, et al., 2019) failed to calculate for the following reasons:    Cannot find a previous HDL lab    Cannot find a previous total cholesterol lab    Imaging:         Results for orders placed during the hospital encounter of 09/26/22    US Guided Lymph Node Biopsy    Narrative  DATE OF EXAM:  9/26/2022 2:26 PM    PROCEDURE:  US GUIDED LYMPH NODE BIOPSY-    INDICATIONS:  NECK AND UPPER CHEST ADENOPATHY; R59.1-Generalized enlarged lymph nodes    COMPARISON:  No Comparisons Available    FLUOROSCOPIC TIME:  None    PHYSICIAN MONITORED CONSCIOUS SEDATION TIME:  None minutes    TECHNIQUE:  After informed consent was obtained a timeout was performed. The  interventional radiology nurse monitored the patient at all times. The  patient was placed supine on the bed and the right neck was  ultrasounded, demonstrating multiple enlarged lymph nodes. An  appropriate access site was selected and the area was prepped and draped  in the usual sterile fashion. The skin was anesthetized with 1%  lidocaine and a skin incision was made. Next multiple passes were made  into an enlarged right cervical lymph node with an 18-gauge core biopsy  needle. A total of 5 specimens were obtained and given to the  pathologist noted the presence of diagnostic tissue. The patient  tolerated the procedure well without immediate complication.    FINDINGS:  See above    Impression  1. Successful ultrasound-guided biopsy of right cervical adenopathy.    Electronically Signed By-Rustam Bird MD On:9/26/2022 3:54 PM  This report was finalized on [id number] by  Rustam Bird MD.      Results for orders placed during the hospital encounter of 09/14/22    CT Soft Tissue Neck With Contrast    Narrative  CT SOFT TISSUE NECK W CONTRAST-    Date of Exam: 9/14/2022 11:47 AM    Indication: mass - right neck, firm/nontender.    Comparison Exams: None available.    Technique: Multiple axial images were obtained from the skull  base  through the aortic arch after the administration of IV contrast.  Automated exposure control and iterative reconstruction methods were  used.      FINDINGS:  The visualized intracranial contents appear within normal limits.    The globes and orbits are within normal limits.    Nasopharynx is normal.    Oropharynx including base of tongue and epiglottis appear within normal  limits.    Floor of mouth is normal.    Larynx is normal.    Visualized portions of the upper trachea and esophagus are normal.    Thyroid gland is normal.    Bilateral submandibular glands and parotid glands are normal.    There are multiple enlarged cervical, supraclavicular, and upper  mediastinal lymph nodes.  For example there is a right level 2 node  measuring 4.0 x 2.7 cm in size.  There is a right level 3 node measuring  4.3 x 3.2 cm.  There is a left supraclavicular node measuring 1.5 cm in  size.  There is an upper anterior mediastinal lymph node measuring 2.2 x  1.5 cm in size.  There are enlarged right supraclavicular nodes as well.      Bilateral carotid and vertebral arteries are patent without significant  stenosis. Bilateral internal jugular veins appear patent.    No suspicious lytic or sclerotic bony lesion identified.    Impression  1.  Multiple enlarged cervical and supraclavicular lymph nodes and upper  anterior mediastinal nodes.  Differential considerations would include  metastatic disease or lymphoma.  No definite primary neoplasm identified  within the neck.  Consider PET CT scan or CT scan of the chest, abdomen,  and pelvis to evaluate for primary neoplasm.  Alternatively percutaneous  biopsy could be performed of one of these enlarged lymph nodes.    Electronically Signed By-Neri Torres MD On:9/14/2022 12:04 PM  This report was finalized on [id number] by  Neri Torres MD.      Lab Review:   Hospital Outpatient Visit on 02/02/2023   Component Date Value   • QT Interval 02/02/2023 [address] [place] [address]  Outpatient Visit on 09/26/2022   Component Date Value   • Case Report 09/26/2022                      Value:Surgical Pathology Report                         Case: [id number]                                  Authorizing Provider:  Uriel Palmer MD      Collected:           09/26/2022 02:48 PM          Ordering Location:     [facility]       Received:            09/26/2022 03:10 PM                                 INTERVENTIONAL RADIOLOGY                                                     Pathologist:           Jeison Saldivar MD                                                            Specimen:    Lymph Node, right supraclavicular                                                         • Final Diagnosis 09/26/2022                      Value:This result contains rich text formatting which cannot be displayed here.   • Comment 09/26/2022                      Value:This result contains rich text formatting which cannot be displayed here.   • Gross Description 09/26/2022                      Value:This result contains rich text formatting which cannot be displayed here.   • Consult Global Result 09/26/2022 Comment^Text^TXT    Admission on 09/14/2022, Discharged on 09/14/2022   Component Date Value   • Glucose 09/14/2022 128 (H)    • BUN 09/14/2022 12    • Creatinine 09/14/2022 1.08    • Sodium 09/14/2022 141    • Potassium 09/14/2022 4.4    • Chloride 09/14/2022 103    • CO2 09/14/2022 28.0    • Calcium 09/14/2022     • BUN/Creatinine Ratio 09/14/2022 11.1    • Anion Gap 09/14/2022 10.0    • eGFR 09/14/2022 74.3    • WBC 09/14/2022 5.82    • RBC 09/14/2022 5.16    • Hemoglobin 09/14/2022 14.3    • Hematocrit 09/14/2022 43.2    • MCV 09/14/2022 83.7    • MCH 09/14/2022 27.7    • MCHC 09/14/2022 33.1    • RDW 09/14/2022 12.6    • RDW-SD 09/14/2022 38.7    • MPV 09/14/2022 9.3    • Platelets 09/14/2022 310    • Neutrophil % 09/14/2022 60.5    • Lymphocyte % 09/14/2022 19.6    • Monocyte % 09/14/2022 12.7  (H)    • Eosinophil % 09/14/2022 5.3    • Basophil % 09/14/2022 0.7    • Immature Grans % 09/14/2022 1.2 (H)    • Neutrophils, Absolute 09/14/2022 3.52    • Lymphocytes, Absolute 09/14/2022 1.14    • Monocytes, Absolute 09/14/2022 0.74    • Eosinophils, Absolute 09/14/2022 0.31    • Basophils, Absolute 09/14/2022 0.04    • Immature Grans, Absolute 09/14/2022 0.07 (H)    • Creatinine 09/14/2022 1.08    • eGFR 09/14/2022 74.3      Recent labs reviewed and interpreted for clinical significance and application            Level of Care:           Rm Styles MD  02/16/23  .

## 2023-02-22 ENCOUNTER — OFFICE VISIT (OUTPATIENT)
Dept: CARDIOLOGY | Facility: CLINIC | Age: 73
End: 2023-02-22
Payer: MEDICARE

## 2023-02-22 VITALS
BODY MASS INDEX: 26.37 KG/M2 | HEIGHT: 68 IN | SYSTOLIC BLOOD PRESSURE: 122 MMHG | WEIGHT: 174 LBS | HEART RATE: 115 BPM | DIASTOLIC BLOOD PRESSURE: 84 MMHG | RESPIRATION RATE: 18 BRPM

## 2023-02-22 DIAGNOSIS — I35.0 NONRHEUMATIC AORTIC VALVE STENOSIS: ICD-10-CM

## 2023-02-22 DIAGNOSIS — I10 ESSENTIAL HYPERTENSION: ICD-10-CM

## 2023-02-22 DIAGNOSIS — I25.10 CORONARY ARTERY DISEASE INVOLVING NATIVE CORONARY ARTERY OF NATIVE HEART WITHOUT ANGINA PECTORIS: ICD-10-CM

## 2023-02-22 DIAGNOSIS — I25.118 CORONARY ARTERY DISEASE OF NATIVE ARTERY OF NATIVE HEART WITH STABLE ANGINA PECTORIS: ICD-10-CM

## 2023-02-22 DIAGNOSIS — R00.0 TACHYCARDIA: Primary | ICD-10-CM

## 2023-02-22 DIAGNOSIS — E78.5 HYPERLIPIDEMIA, UNSPECIFIED HYPERLIPIDEMIA TYPE: ICD-10-CM

## 2023-02-22 PROCEDURE — 99214 OFFICE O/P EST MOD 30 MIN: CPT | Performed by: INTERNAL MEDICINE

## 2023-02-22 RX ORDER — LANOLIN ALCOHOL/MO/W.PET/CERES
1 CREAM (GRAM) TOPICAL DAILY
COMMUNITY
Start: 2023-02-10

## 2023-02-22 RX ORDER — FLUDROCORTISONE ACETATE 0.1 MG/1
0.1 TABLET ORAL DAILY
Qty: 30 TABLET | Refills: 5 | Status: SHIPPED | OUTPATIENT
Start: 2023-02-22

## 2023-02-22 NOTE — PROGRESS NOTES
Cardiology Clinic Note  Rm Styles MD, PhD    Subjective:     Encounter Date:02/22/2023      Patient ID: Oscar Nash is a 72 y.o. male.    Chief Complaint:  Chief Complaint   Patient presents with   • Follow-up       HPI:       I had the pleasure of seeing this 72-year-old patient who is well-known to me for 6-month follow-up.  2018 patient initially presented for typical anginal chest pain and dyspnea on exertion CCS class III and was taken to the Cath Lab for left heart cath and coronary angiography finding multivessel CAD ultimately getting 3 vessel coronary bypass graft with LIMA to LAD, SVG to OM1 and RCA and did well postoperatively.  He presents back to clinic today, weight is down almost 30 pounds, he was diagnosed with Hodgkin's lymphoma ultimately getting chemotherapy treatments.  He is very tired, blood pressures are much lower and he is off lisinopril HCTZ only on metoprolol.  He has dizziness episodes and signs of volume depletion with poor p.o. intake, no coronary symptomatology such as chest pain, he does have dyspnea on exertion.   He is status post bypass 2018 nearly 6 years ago, He is compliant with all medicines on aspirin, high intensity statin therapy and beta-blocker with preserved LV systolic function and no heart failure signs or symptoms.       He has repeat encounter today, appears much improved with better strength, he still appears volume depleted, denies any syncopal episodes or chest pain.  Chronic shortness of breath and he is walking with a cane still     Review of systems negative except was mentioned above x14 point review of systems  Historical data copied forward from previous encounters in EMR is unchanged           Normal exam today  Vitals reviewed 122/84 heart rate 110 120 sinus tachycardia  Weight 174  Sternal clean and dry  Vitals reviewed  Regular rate and rhythm no rubs gallops heave or lift, 2 out of 6 systolic ejection murmur lower sternal border crescendo  "consistent with aortic stenosis, preserved S2, no changes  No clubbing cyanosis or edema  Intact grossly  Soft nontender nondistended  Clear to auscultation  Appears dry on exam     Essential hypertension  Multivessel coronary disease status post bypass 2018  Essential hypertension hyperlipidemia  Hodgkin's lymphoma  Hypotension  Recent chemotherapy  Mild to moderate aortic valve stenosis        Plan today  Overall improved, blood pressure more stable, continuing midodrine twice a day  Off diuretics lisinopril HCTZ  Start Florinef 1.1 daily  Appears volume depleted today, discussed increasing electrolyte rich fluid intake such as Gatorade or Pedialyte in his current status  Increase oral intake of nutrition with 3 healthy meals a day  Weight loss is still occurring presently, down to 174, 40 pounds down over the last 6 months    Activity and exercise regimen per AHA guidelines as tolerated by functional status     Murmur on exam consistent with mild to moderate aortic valve stenosis but has not had an echo in over 3 to 4 years for reevaluation, no indication presently to repeat     Avoid stress testing at this point, we will pursue treadmill stress testing at a later date for risk stratification greater than 5 years after bypass now.     Florinef added today        Rm Styles MD, PhD       Historical data copied forward from previous encounters in EMR including the history, exam, and assessment/plan has been reviewed and is unchanged unless noted otherwise.    Cardiac medicines reviewed with risk, benefits, and necessity of each discussed.    Risk and benefit of cardiac testing reviewed including death heart attack stroke pain bleeding infection need for vascular /cardiovascular surgery were discussed and the patient     Objective:         /84 (BP Location: Right arm, Patient Position: Sitting)   Pulse 115   Resp 18   Ht 172.7 cm (68\")   Wt 78.9 kg (174 lb)   BMI 26.46 kg/m²         The pleasure to be " involved in this patient's cardiovascular care.  Please call with any questions or concerns  Rm Styles MD, PhD    Most recent EKG as reviewed and interpreted by me:  Procedures     Most recent echo as reviewed and interpreted by me:      Most recent stress test as reviewed and interpreted by me:      Most recent cardiac catheterization as reviewed interpreted by me:  No results found for this or any previous visit.    The following portions of the patient's history were reviewed and updated as appropriate: allergies, current medications, past family history, past medical history, past social history, past surgical history and problem list.      ROS:  14 point review of systems negative except as mentioned above    Current Outpatient Medications:   •  albuterol sulfate  (90 Base) MCG/ACT inhaler, As Needed., Disp: , Rfl:   •  aspirin 81 MG EC tablet, Take 81 mg by mouth Daily., Disp: , Rfl:   •  atorvastatin (LIPITOR) 40 MG tablet, Take 1 tablet by mouth Daily., Disp: 90 tablet, Rfl: 3  •  ferrous sulfate 325 (65 FE) MG EC tablet, Take 1 tablet by mouth Daily., Disp: , Rfl:   •  metoprolol succinate XL (TOPROL-XL) 50 MG 24 hr tablet, Take 1 tablet by mouth Daily., Disp: 90 tablet, Rfl: 3  •  midodrine (PROAMATINE) 5 MG tablet, Take 1 tablet by mouth 3 (Three) Times a Day Before Meals. (Patient taking differently: Take 5 mg by mouth 2 (Two) Times a Day.), Disp: 90 tablet, Rfl: 5  •  potassium chloride (MICRO-K) 10 MEQ CR capsule, Daily., Disp: , Rfl:   •  fludrocortisone 0.1 MG tablet, Take 1 tablet by mouth Daily., Disp: 30 tablet, Rfl: 5    Problem List:  Patient Active Problem List   Diagnosis   • HLD (hyperlipidemia)   • HTN (hypertension)   • Heart murmur   • CAD (coronary artery disease)     Past Medical History:  Past Medical History:   Diagnosis Date   • CAD (coronary artery disease)    • HLD (hyperlipidemia) 5/21/2019   • HTN (hypertension) 5/21/2019   • Hyperlipidemia    • Hypertension    •  Murmur, heart      Past Surgical History:  Past Surgical History:   Procedure Laterality Date   • CORONARY ARTERY BYPASS GRAFT  07/2018    LIMA to LAD, SVGs to OM1 and RCA   • HERNIA REPAIR     • KNEE SURGERY Bilateral    • ROTATOR CUFF REPAIR Bilateral    • US GUIDED LYMPH NODE BIOPSY  9/26/2022     Social History:  Social History     Socioeconomic History   • Marital status:    Tobacco Use   • Smoking status: Never   • Smokeless tobacco: Never   Substance and Sexual Activity   • Alcohol use: Not Currently   • Drug use: Not Currently   • Sexual activity: Defer     Allergies:  Allergies   Allergen Reactions   • Penicillins Unknown (See Comments) and Rash     .       Immunizations:  Immunization History   Administered Date(s) Administered   • FLUAD TRI 65YR+ 10/12/2019   • Flu Vaccine Quad PF >36MO 09/27/2016   • Fluad Quad 65+ 09/28/2020   • Hepatitis A 04/30/2018, 05/18/2019   • Influenza, Unspecified 01/13/2013, 09/24/2015, 10/27/2016, 09/07/2017, 09/22/2018, 10/13/2019   • Pneumococcal Conjugate 13-Valent (PCV13) 09/22/2018   • Pneumococcal Polysaccharide (PPSV23) 10/12/2019, 10/13/2019   • Shingrix 12/04/2020   • Tdap 12/31/2014, 12/28/2015   • Zostavax 08/31/2016, 09/27/2016            In-Office Procedure(s):  No orders to display        ASCVD RIsk Score::  The ASCVD Risk score (Burnside DK, et al., 2019) failed to calculate for the following reasons:    Cannot find a previous HDL lab    Cannot find a previous total cholesterol lab    Imaging:         Results for orders placed during the hospital encounter of 09/26/22    US Guided Lymph Node Biopsy    Narrative  DATE OF EXAM:  9/26/2022 2:26 PM    PROCEDURE:  US GUIDED LYMPH NODE BIOPSY-    INDICATIONS:  NECK AND UPPER CHEST ADENOPATHY; R59.1-Generalized enlarged lymph nodes    COMPARISON:  No Comparisons Available    FLUOROSCOPIC TIME:  None    PHYSICIAN MONITORED CONSCIOUS SEDATION TIME:  None minutes    TECHNIQUE:  After informed consent was obtained a  timeout was performed. The  interventional radiology nurse monitored the patient at all times. The  patient was placed supine on the bed and the right neck was  ultrasounded, demonstrating multiple enlarged lymph nodes. An  appropriate access site was selected and the area was prepped and draped  in the usual sterile fashion. The skin was anesthetized with 1%  lidocaine and a skin incision was made. Next multiple passes were made  into an enlarged right cervical lymph node with an 18-gauge core biopsy  needle. A total of 5 specimens were obtained and given to the  pathologist noted the presence of diagnostic tissue. The patient  tolerated the procedure well without immediate complication.    FINDINGS:  See above    Impression  1. Successful ultrasound-guided biopsy of right cervical adenopathy.    Electronically Signed By-Rustam Bird MD On:9/26/2022 3:54 PM  This report was finalized on [id number] by  Rsutam Bird MD.      Results for orders placed during the hospital encounter of 09/14/22    CT Soft Tissue Neck With Contrast    Narrative  CT SOFT TISSUE NECK W CONTRAST-    Date of Exam: 9/14/2022 11:47 AM    Indication: mass - right neck, firm/nontender.    Comparison Exams: None available.    Technique: Multiple axial images were obtained from the skull base  through the aortic arch after the administration of IV contrast.  Automated exposure control and iterative reconstruction methods were  used.      FINDINGS:  The visualized intracranial contents appear within normal limits.    The globes and orbits are within normal limits.    Nasopharynx is normal.    Oropharynx including base of tongue and epiglottis appear within normal  limits.    Floor of mouth is normal.    Larynx is normal.    Visualized portions of the upper trachea and esophagus are normal.    Thyroid gland is normal.    Bilateral submandibular glands and parotid glands are normal.    There are multiple enlarged cervical, supraclavicular, and  upper  mediastinal lymph nodes.  For example there is a right level 2 node  measuring 4.0 x 2.7 cm in size.  There is a right level 3 node measuring  4.3 x 3.2 cm.  There is a left supraclavicular node measuring 1.5 cm in  size.  There is an upper anterior mediastinal lymph node measuring 2.2 x  1.5 cm in size.  There are enlarged right supraclavicular nodes as well.      Bilateral carotid and vertebral arteries are patent without significant  stenosis. Bilateral internal jugular veins appear patent.    No suspicious lytic or sclerotic bony lesion identified.    Impression  1.  Multiple enlarged cervical and supraclavicular lymph nodes and upper  anterior mediastinal nodes.  Differential considerations would include  metastatic disease or lymphoma.  No definite primary neoplasm identified  within the neck.  Consider PET CT scan or CT scan of the chest, abdomen,  and pelvis to evaluate for primary neoplasm.  Alternatively percutaneous  biopsy could be performed of one of these enlarged lymph nodes.    Electronically Signed By-Neri Torres MD On:9/14/2022 12:04 PM  This report was finalized on [id number] by  Neri Torres MD.      Lab Review:   Hospital Outpatient Visit on 02/02/2023   Component Date Value   • QT Interval 02/02/2023 344    Hospital Outpatient Visit on 09/26/2022   Component Date Value   • Case Report 09/26/2022                      Value:Surgical Pathology Report                         Case: [id number]                                  Authorizing Provider:  Uriel Palmer MD      Collected:           09/26/2022 02:48 PM          Ordering Location:     [facility]       Received:            09/26/2022 03:10 PM                                 INTERVENTIONAL RADIOLOGY                                                     Pathologist:           Jeison Saldivar MD                                                            Specimen:    Lymph Node, right supraclavicular                                                          • Final Diagnosis 09/26/2022                      Value:This result contains rich text formatting which cannot be displayed here.   • Comment 09/26/2022                      Value:This result contains rich text formatting which cannot be displayed here.   • Gross Description 09/26/2022                      Value:This result contains rich text formatting which cannot be displayed here.   • Consult Global Result 09/26/2022 Comment^Text^TXT    Admission on 09/14/2022, Discharged on 09/14/2022   Component Date Value   • Glucose 09/14/2022 128 (H)    • BUN 09/14/2022 12    • Creatinine 09/14/2022 1.08    • Sodium 09/14/2022 141    • Potassium 09/14/2022 4.4    • Chloride 09/14/2022 103    • CO2 09/14/2022 28.0    • Calcium 09/14/2022     • BUN/Creatinine Ratio 09/14/2022 11.1    • Anion Gap 09/14/2022 10.0    • eGFR 09/14/2022 74.3    • WBC 09/14/2022 5.82    • RBC 09/14/2022 5.16    • Hemoglobin 09/14/2022 14.3    • Hematocrit 09/14/2022 43.2    • MCV 09/14/2022 83.7    • MCH 09/14/2022 27.7    • MCHC 09/14/2022 33.1    • RDW 09/14/2022 12.6    • RDW-SD 09/14/2022 38.7    • MPV 09/14/2022 9.3    • Platelets 09/14/2022 310    • Neutrophil % 09/14/2022 60.5    • Lymphocyte % 09/14/2022 19.6    • Monocyte % 09/14/2022 12.7 (H)    • Eosinophil % 09/14/2022 5.3    • Basophil % 09/14/2022 0.7    • Immature Grans % 09/14/2022 1.2 (H)    • Neutrophils, Absolute 09/14/2022 3.52    • Lymphocytes, Absolute 09/14/2022 1.14    • Monocytes, Absolute 09/14/2022 0.74    • Eosinophils, Absolute 09/14/2022 0.31    • Basophils, Absolute 09/14/2022 0.04    • Immature Grans, Absolute 09/14/2022 0.07 (H)    • Creatinine 09/14/2022 1.08    • eGFR 09/14/2022 74.3      Recent labs reviewed and interpreted for clinical significance and application            Level of Care:           Rm Styles MD  02/22/23  .

## 2023-02-23 ENCOUNTER — TELEPHONE (OUTPATIENT)
Dept: CARDIOLOGY | Facility: CLINIC | Age: 73
End: 2023-02-23
Payer: MEDICARE

## 2023-02-23 NOTE — TELEPHONE ENCOUNTER
[facility] pharmacy called about fludrocortisone 0.1 mg and they are wanting to know if he is to continue the midodrine 5 mg

## 2023-03-09 ENCOUNTER — LAB REQUISITION (OUTPATIENT)
Dept: LAB | Facility: HOSPITAL | Age: 73
End: 2023-03-09
Payer: MEDICARE

## 2023-03-09 ENCOUNTER — TRANSCRIBE ORDERS (OUTPATIENT)
Dept: ADMINISTRATIVE | Facility: HOSPITAL | Age: 73
End: 2023-03-09
Payer: MEDICARE

## 2023-03-09 DIAGNOSIS — C81.70 OTHER HODGKIN LYMPHOMA, UNSPECIFIED SITE: ICD-10-CM

## 2023-03-09 DIAGNOSIS — Z51.11 MAINTENANCE CHEMOTHERAPY: Primary | ICD-10-CM

## 2023-03-09 DIAGNOSIS — Z85.71 PERSONAL HISTORY OF HODGKIN LYMPHOMA: ICD-10-CM

## 2023-03-09 LAB
ANISOCYTOSIS BLD QL: ABNORMAL
BASOPHILS # BLD MANUAL: 0.05 10*3/MM3 (ref 0–0.2)
BASOPHILS NFR BLD MANUAL: 4 % (ref 0–1.5)
DEPRECATED RDW RBC AUTO: 52.1 FL (ref 37–54)
EOSINOPHIL # BLD MANUAL: 0.07 10*3/MM3 (ref 0–0.4)
EOSINOPHIL NFR BLD MANUAL: 5 % (ref 0.3–6.2)
ERYTHROCYTE [DISTWIDTH] IN BLOOD BY AUTOMATED COUNT: 17.4 % (ref 12.3–15.4)
HCT VFR BLD AUTO: 25 % (ref 37.5–51)
HGB BLD-MCNC: 8 G/DL (ref 13–17.7)
LYMPHOCYTES # BLD MANUAL: 0.2 10*3/MM3 (ref 0.7–3.1)
MCH RBC QN AUTO: 27.6 PG (ref 26.6–33)
MCHC RBC AUTO-ENTMCNC: 32.1 G/DL (ref 31.5–35.7)
MCV RBC AUTO: 85.9 FL (ref 79–97)
NEUTROPHILS # BLD AUTO: 0.99 10*3/MM3 (ref 1.7–7)
NEUTROPHILS NFR BLD MANUAL: 70 % (ref 42.7–76)
NEUTS BAND NFR BLD MANUAL: 6 % (ref 0–5)
PLAT MORPH BLD: NORMAL
PLATELET # BLD AUTO: 270 10*3/MM3 (ref 140–450)
PMV BLD AUTO: 8 FL (ref 6–12)
POIKILOCYTOSIS BLD QL SMEAR: ABNORMAL
RBC # BLD AUTO: 2.91 10*6/MM3 (ref 4.14–5.8)
SCAN SLIDE: NORMAL
TOXIC GRANULATION: ABNORMAL
VARIANT LYMPHS NFR BLD MANUAL: 15 % (ref 19.6–45.3)
WBC NRBC COR # BLD: 1.3 10*3/MM3 (ref 3.4–10.8)

## 2023-03-09 PROCEDURE — 85025 COMPLETE CBC W/AUTO DIFF WBC: CPT | Performed by: INTERNAL MEDICINE

## 2023-04-19 ENCOUNTER — HOSPITAL ENCOUNTER (OUTPATIENT)
Dept: CARDIOLOGY | Facility: HOSPITAL | Age: 73
Discharge: HOME | End: 2023-04-19
Admitting: INTERNAL MEDICINE
Payer: MEDICARE

## 2023-04-19 DIAGNOSIS — Z51.11 MAINTENANCE CHEMOTHERAPY: ICD-10-CM

## 2023-04-19 PROCEDURE — 93306 TTE W/DOPPLER COMPLETE: CPT

## 2023-04-20 LAB
BH CV ECHO MEAS - ACS: 1.62 CM
BH CV ECHO MEAS - AO MAX PG: 14.7 MMHG
BH CV ECHO MEAS - AO MEAN PG: 8.6 MMHG
BH CV ECHO MEAS - AO ROOT DIAM: 3.2 CM
BH CV ECHO MEAS - AO V2 MAX: 191.7 CM/SEC
BH CV ECHO MEAS - AO V2 VTI: 39.6 CM
BH CV ECHO MEAS - AVA(I,D): 1.81 CM2
BH CV ECHO MEAS - EDV(CUBED): 74.1 ML
BH CV ECHO MEAS - EDV(MOD-SP4): 72.2 ML
BH CV ECHO MEAS - EF(MOD-BP): 48 %
BH CV ECHO MEAS - EF(MOD-SP4): 48.5 %
BH CV ECHO MEAS - ESV(CUBED): 35.1 ML
BH CV ECHO MEAS - ESV(MOD-SP4): 37.1 ML
BH CV ECHO MEAS - FS: 22.1 %
BH CV ECHO MEAS - IVS/LVPW: 0.89 CM
BH CV ECHO MEAS - IVSD: 1.16 CM
BH CV ECHO MEAS - LA DIMENSION: 4.4 CM
BH CV ECHO MEAS - LV DIASTOLIC VOL/BSA (35-75): 37.5 CM2
BH CV ECHO MEAS - LV MASS(C)D: 185.5 GRAMS
BH CV ECHO MEAS - LV MAX PG: 6.4 MMHG
BH CV ECHO MEAS - LV MEAN PG: 3.3 MMHG
BH CV ECHO MEAS - LV SYSTOLIC VOL/BSA (12-30): 19.3 CM2
BH CV ECHO MEAS - LV V1 MAX: 126.6 CM/SEC
BH CV ECHO MEAS - LV V1 VTI: 23.6 CM
BH CV ECHO MEAS - LVIDD: 4.2 CM
BH CV ECHO MEAS - LVIDS: 3.3 CM
BH CV ECHO MEAS - LVOT AREA: 3 CM2
BH CV ECHO MEAS - LVOT DIAM: 1.97 CM
BH CV ECHO MEAS - LVPWD: 1.31 CM
BH CV ECHO MEAS - MR MAX PG: 77.5 MMHG
BH CV ECHO MEAS - MR MAX VEL: 439.4 CM/SEC
BH CV ECHO MEAS - MV A MAX VEL: 85.7 CM/SEC
BH CV ECHO MEAS - MV DEC SLOPE: 387 CM/SEC2
BH CV ECHO MEAS - MV DEC TIME: 0.22 MSEC
BH CV ECHO MEAS - MV E MAX VEL: 85.7 CM/SEC
BH CV ECHO MEAS - MV E/A: 1
BH CV ECHO MEAS - MV MAX PG: 3.8 MMHG
BH CV ECHO MEAS - MV MEAN PG: 2.16 MMHG
BH CV ECHO MEAS - MV V2 VTI: 26.6 CM
BH CV ECHO MEAS - MVA(VTI): 2.7 CM2
BH CV ECHO MEAS - PA ACC TIME: 0.11 SEC
BH CV ECHO MEAS - PA PR(ACCEL): 30.2 MMHG
BH CV ECHO MEAS - PA V2 MAX: 176 CM/SEC
BH CV ECHO MEAS - PULM A REVS DUR: 0.09 SEC
BH CV ECHO MEAS - PULM A REVS VEL: 26.9 CM/SEC
BH CV ECHO MEAS - PULM DIAS VEL: 40.1 CM/SEC
BH CV ECHO MEAS - PULM S/D: 1.28
BH CV ECHO MEAS - PULM SYS VEL: 51.2 CM/SEC
BH CV ECHO MEAS - RAP SYSTOLE: 3 MMHG
BH CV ECHO MEAS - RV MAX PG: 3.4 MMHG
BH CV ECHO MEAS - RV V1 MAX: 92 CM/SEC
BH CV ECHO MEAS - RV V1 VTI: 18.4 CM
BH CV ECHO MEAS - RVDD: 3.6 CM
BH CV ECHO MEAS - RVSP: 24.5 MMHG
BH CV ECHO MEAS - SI(MOD-SP4): 18.2 ML/M2
BH CV ECHO MEAS - SV(LVOT): 71.9 ML
BH CV ECHO MEAS - SV(MOD-SP4): 35 ML
BH CV ECHO MEAS - TR MAX PG: 21.5 MMHG
BH CV ECHO MEAS - TR MAX VEL: 229.8 CM/SEC
LV EF 2D ECHO EST: 55 %
MAXIMAL PREDICTED HEART RATE: 150 BPM
STRESS TARGET HR: 128 BPM

## 2023-04-27 ENCOUNTER — OFFICE VISIT (OUTPATIENT)
Dept: CARDIOLOGY | Facility: CLINIC | Age: 73
End: 2023-04-27
Payer: MEDICARE

## 2023-04-27 VITALS
WEIGHT: 182 LBS | SYSTOLIC BLOOD PRESSURE: 127 MMHG | HEART RATE: 88 BPM | HEIGHT: 68 IN | BODY MASS INDEX: 27.58 KG/M2 | RESPIRATION RATE: 18 BRPM | DIASTOLIC BLOOD PRESSURE: 82 MMHG

## 2023-04-27 DIAGNOSIS — I35.0 NONRHEUMATIC AORTIC VALVE STENOSIS: ICD-10-CM

## 2023-04-27 DIAGNOSIS — R00.0 TACHYCARDIA: ICD-10-CM

## 2023-04-27 DIAGNOSIS — I10 ESSENTIAL HYPERTENSION: ICD-10-CM

## 2023-04-27 DIAGNOSIS — E78.5 HYPERLIPIDEMIA, UNSPECIFIED HYPERLIPIDEMIA TYPE: Primary | ICD-10-CM

## 2023-04-27 DIAGNOSIS — I10 PRIMARY HYPERTENSION: ICD-10-CM

## 2023-05-02 NOTE — PROGRESS NOTES
Cardiology Clinic Note  Rm Styles MD, PhD    Subjective:     Encounter Date:04/27/2023      Patient ID: Oscar Nash is a 72 y.o. male.    Chief Complaint:  Chief Complaint   Patient presents with   • Follow-up       HPI:     I had the pleasure of seeing this 72-year-old patient who is well-known to me for 6-month follow-up.  2018 patient initially presented for typical anginal chest pain and dyspnea on exertion CCS class III and was taken to the Cath Lab for left heart cath and coronary angiography finding multivessel CAD ultimately getting 3 vessel coronary bypass graft with LIMA to LAD, SVG to OM1 and RCA and did well postoperatively.  He presents back to clinic today, weight is down almost 30 pounds, he was diagnosed with Hodgkin's lymphoma ultimately getting chemotherapy treatments.  He is very tired, blood pressures are much lower and he is off lisinopril HCTZ only on metoprolol.  He has dizziness episodes and signs of volume depletion with poor p.o. intake, no coronary symptomatology such as chest pain, he does have dyspnea on exertion.   He is status post bypass 2018 nearly 6 years ago, He is compliant with all medicines on aspirin, high intensity statin therapy and beta-blocker with preserved LV systolic function and no heart failure signs or symptoms.       He has repeat encounter today, appears much improved with better strength, he still appears volume depleted, denies any syncopal episodes or chest pain.  Chronic shortness of breath and he is walking with a cane still     Review of systems negative except was mentioned above x14 point review of systems  Historical data copied forward from previous encounters in EMR is unchanged     2D echo from April 2023 demonstrates normal EF 50 to 55% low normal, normal diastolic function, mild aortic valve stenosis only moderate calcification which is chronic, normal filling pressures estimated.       Vitals reviewed 127/88 heart rate 80 sinus rhythm  Weight  "174 improved to 182 with cessation of chemotherapy and better nutrition  Sternal clean and dry  Vitals reviewed  Regular rate and rhythm no rubs gallops heave or lift, 2 out of 6 systolic ejection murmur lower sternal border crescendo consistent with mild aortic stenosis, preserved S2, no changes  No clubbing cyanosis or edema  Intact grossly  Soft nontender nondistended  Clear to auscultation     Essential hypertension  Multivessel coronary disease status post bypass 2018  Essential hypertension hyperlipidemia  Hodgkin's lymphoma  Hypotension  Recent chemotherapy  Mild to moderate aortic valve stenosis unchanged        Plan today  Overall improved, blood pressure more stable, Taper midodrine as tolerated  Continue Florinef once a day   off diuretics lisinopril HCTZ       Activity and exercise regimen per AHA guidelines as tolerated by functional status     Murmur on exam consistent with mild to moderate aortic valve stenosis, echo demonstrates not more than mild to moderate aortic stenosis which is unchanged from prior encounter.  No intervention required, aspirin for life would be recommended     Avoid stress testing at this point, we will pursue treadmill stress testing at a later date for risk stratification greater than 5 years after bypass now.     Otherwise feeling much more functional      Back in 6 months       Rm Styles MD, PhD    Objective:         /82 (BP Location: Left arm, Patient Position: Sitting)   Pulse 88   Resp 18   Ht 172.7 cm (68\")   Wt 82.6 kg (182 lb)   BMI 27.67 kg/m²           The pleasure to be involved in this patient's cardiovascular care.  Please call with any questions or concerns  Rm Styles MD, PhD    Most recent EKG as reviewed and interpreted by me:  Procedures     Most recent echo as reviewed and interpreted by me:  Results for orders placed during the hospital encounter of 04/19/23    Adult Transthoracic Echo Complete W/ Cont if Necessary Per " Protocol    Interpretation Summary  •  Left ventricular systolic function is low normal. Left ventricular ejection fraction appears to be 51 - 55%.  •  Left ventricular diastolic function was normal.  •  Mild aortic valve stenosis is present.  •  Estimated right ventricular systolic pressure from tricuspid regurgitation is normal (<35 mmHg).      Most recent stress test as reviewed and interpreted by me:      Most recent cardiac catheterization as reviewed interpreted by me:  No results found for this or any previous visit.    The following portions of the patient's history were reviewed and updated as appropriate: allergies, current medications, past family history, past medical history, past social history, past surgical history and problem list.      ROS:  14 point review of systems negative except as mentioned above    Current Outpatient Medications:   •  aspirin 81 MG EC tablet, Take 1 tablet by mouth Daily., Disp: , Rfl:   •  fludrocortisone 0.1 MG tablet, Take 1 tablet by mouth Daily., Disp: 30 tablet, Rfl: 5  •  metoprolol succinate XL (TOPROL-XL) 50 MG 24 hr tablet, Take 1 tablet by mouth Daily., Disp: 90 tablet, Rfl: 3  •  midodrine (PROAMATINE) 5 MG tablet, Take 1 tablet by mouth 3 (Three) Times a Day Before Meals. (Patient taking differently: Take 1 tablet by mouth Every Morning.), Disp: 90 tablet, Rfl: 5  •  potassium chloride ER (K-TAB) 20 MEQ tablet controlled-release ER tablet, 1 tablet 2 (Two) Times a Day., Disp: , Rfl:     Problem List:  Patient Active Problem List   Diagnosis   • HLD (hyperlipidemia)   • HTN (hypertension)   • Heart murmur   • CAD (coronary artery disease)     Past Medical History:  Past Medical History:   Diagnosis Date   • CAD (coronary artery disease)    • HLD (hyperlipidemia) 5/21/2019   • HTN (hypertension) 5/21/2019   • Hyperlipidemia    • Hypertension    • Murmur, heart      Past Surgical History:  Past Surgical History:   Procedure Laterality Date   • CORONARY ARTERY  BYPASS GRAFT  07/2018    LIMA to LAD, SVGs to OM1 and RCA   • HERNIA REPAIR     • KNEE SURGERY Bilateral    • ROTATOR CUFF REPAIR Bilateral    • US GUIDED LYMPH NODE BIOPSY  9/26/2022     Social History:  Social History     Socioeconomic History   • Marital status:    Tobacco Use   • Smoking status: Never   • Smokeless tobacco: Never   Substance and Sexual Activity   • Alcohol use: Not Currently   • Drug use: Not Currently   • Sexual activity: Defer     Allergies:  Allergies   Allergen Reactions   • Penicillins Unknown (See Comments) and Rash     .       Immunizations:  Immunization History   Administered Date(s) Administered   • FLUAD TRI 65YR+ 10/12/2019   • Flu Vaccine Quad PF >36MO 09/27/2016   • Fluad Quad 65+ 09/28/2020   • Hepatitis A 04/30/2018, 05/18/2019   • Influenza, Unspecified 01/13/2013, 09/24/2015, 10/27/2016, 09/07/2017, 09/22/2018, 10/13/2019   • Pneumococcal Conjugate 13-Valent (PCV13) 09/22/2018   • Pneumococcal Polysaccharide (PPSV23) 10/12/2019, 10/13/2019   • Shingrix 12/04/2020   • Tdap 12/31/2014, 12/28/2015   • Zostavax 08/31/2016, 09/27/2016            In-Office Procedure(s):  No orders to display        ASCVD RIsk Score::  The ASCVD Risk score ([place] DK, et al., 2019) failed to calculate for the following reasons:    Cannot find a previous HDL lab    Cannot find a previous total cholesterol lab    Imaging:         Results for orders placed during the hospital encounter of 09/26/22    US Guided Lymph Node Biopsy    Narrative  DATE OF EXAM:  9/26/2022 2:26 PM    PROCEDURE:  US GUIDED LYMPH NODE BIOPSY-    INDICATIONS:  NECK AND UPPER CHEST ADENOPATHY; R59.1-Generalized enlarged lymph nodes    COMPARISON:  No Comparisons Available    FLUOROSCOPIC TIME:  None    PHYSICIAN MONITORED CONSCIOUS SEDATION TIME:  None minutes    TECHNIQUE:  After informed consent was obtained a timeout was performed. The  interventional radiology nurse monitored the patient at all times. The  patient was  placed supine on the bed and the right neck was  ultrasounded, demonstrating multiple enlarged lymph nodes. An  appropriate access site was selected and the area was prepped and draped  in the usual sterile fashion. The skin was anesthetized with 1%  lidocaine and a skin incision was made. Next multiple passes were made  into an enlarged right cervical lymph node with an 18-gauge core biopsy  needle. A total of 5 specimens were obtained and given to the  pathologist noted the presence of diagnostic tissue. The patient  tolerated the procedure well without immediate complication.    FINDINGS:  See above    Impression  1. Successful ultrasound-guided biopsy of right cervical adenopathy.    Electronically Signed By-Rustam Bird MD On:9/26/2022 3:54 PM  This report was finalized on [id number] by  Rustam Bird MD.      Results for orders placed during the hospital encounter of 09/14/22    CT Soft Tissue Neck With Contrast    Narrative  CT SOFT TISSUE NECK W CONTRAST-    Date of Exam: 9/14/2022 11:47 AM    Indication: mass - right neck, firm/nontender.    Comparison Exams: None available.    Technique: Multiple axial images were obtained from the skull base  through the aortic arch after the administration of IV contrast.  Automated exposure control and iterative reconstruction methods were  used.      FINDINGS:  The visualized intracranial contents appear within normal limits.    The globes and orbits are within normal limits.    Nasopharynx is normal.    Oropharynx including base of tongue and epiglottis appear within normal  limits.    Floor of mouth is normal.    Larynx is normal.    Visualized portions of the upper trachea and esophagus are normal.    Thyroid gland is normal.    Bilateral submandibular glands and parotid glands are normal.    There are multiple enlarged cervical, supraclavicular, and upper  mediastinal lymph nodes.  For example there is a right level 2 node  measuring 4.0 x 2.7 cm in size.   There is a right level 3 node measuring  4.3 x 3.2 cm.  There is a left supraclavicular node measuring 1.5 cm in  size.  There is an upper anterior mediastinal lymph node measuring 2.2 x  1.5 cm in size.  There are enlarged right supraclavicular nodes as well.      Bilateral carotid and vertebral arteries are patent without significant  stenosis. Bilateral internal jugular veins appear patent.    No suspicious lytic or sclerotic bony lesion identified.    Impression  1.  Multiple enlarged cervical and supraclavicular lymph nodes and upper  anterior mediastinal nodes.  Differential considerations would include  metastatic disease or lymphoma.  No definite primary neoplasm identified  within the neck.  Consider PET CT scan or CT scan of the chest, abdomen,  and pelvis to evaluate for primary neoplasm.  Alternatively percutaneous  biopsy could be performed of one of these enlarged lymph nodes.    Electronically Signed By-Neri Torres MD On:9/14/2022 12:04 PM  This report was finalized on [id number] by  Neri Torres MD.      Lab Review:   Hospital Outpatient Visit on 04/19/2023   Component Date Value   • Target HR (85%) 04/19/2023 128    • Max. Pred. HR (100%) 04/19/2023 150    • LVIDd 04/19/2023 4.2    • LVIDs 04/19/2023 3.3    • IVSd 04/19/2023 1.16    • LVPWd 04/19/2023 1.31    • FS 04/19/2023 22.1    • IVS/LVPW 04/19/2023 0.89    • ESV(cubed) 04/19/2023 35.1    • LV Sys Vol (BSA correcte* 04/19/2023 19.3    • EDV(cubed) 04/19/2023 74.1    • LV Wei Vol (BSA correct* 04/19/2023 37.5    • LVOT area 04/19/2023 3.0    • LV mass(C)d 04/19/2023 185.5    • LVOT diam 04/19/2023 1.97    • EDV(MOD-sp4) 04/19/2023 72.2    • ESV(MOD-sp4) 04/19/2023 37.1    • SV(MOD-sp4) 04/19/2023 35.0    • SI(MOD-sp4) 04/19/2023 18.2    • EF(MOD-sp4) 04/19/2023 48.5    • MV E max bri 04/19/2023 85.7    • MV A max bri 04/19/2023 85.7    • MV dec time 04/19/2023 0.22    • MV E/A 04/19/2023 1.00    • Pulm A Revs Dur 04/19/2023 0.09    •  SV(LVOT) 04/19/2023 71.9    • RVIDd 04/19/2023 3.6    • LA dimension (2D)  04/19/2023 4.4    • Pulm Sys Valerio 04/19/2023 51.2    • Pulm Wei Valerio 04/19/2023 40.1    • Pulm S/D 04/19/2023 1.28    • Pulm A Revs Valerio 04/19/2023 26.9    • LV V1 max 04/19/2023 126.6    • LV V1 max PG 04/19/2023 6.4    • LV V1 mean PG 04/19/2023 3.3    • LV V1 VTI 04/19/2023 23.6    • Ao pk valerio 04/19/2023 191.7    • Ao max PG 04/19/2023 14.7    • Ao mean PG 04/19/2023 8.6    • Ao V2 VTI 04/19/2023 39.6    • LEE(I,D) 04/19/2023 1.81    • MV max PG 04/19/2023 3.8    • MV mean PG 04/19/2023 2.16    • MV V2 VTI 04/19/2023 26.6    • MVA(VTI) 04/19/2023 2.7    • MV dec slope 04/19/2023 387.0    • MR max valerio 04/19/2023 439.4    • MR max PG 04/19/2023 77.5    • TR max valerio 04/19/2023 229.8    • TR max PG 04/19/2023 21.5    • RVSP(TR) 04/19/2023 24.5    • RAP systole 04/19/2023 3.0    • RV V1 max PG 04/19/2023 3.4    • RV V1 max 04/19/2023 92.0    • RV V1 VTI 04/19/2023 18.4    • PA V2 max 04/19/2023 176.0    • PA acc time 04/19/2023 0.11    • PA pr(Accel) 04/19/2023 30.2    • Ao root diam 04/19/2023 3.2    • ACS 04/19/2023 1.62    • EF(MOD-bp) 04/19/2023 48.0    • Echo EF Estimated 04/19/2023 55.0    Lab Requisition on 03/09/2023   Component Date Value   • WBC 03/09/2023 1.30 (C)    • RBC 03/09/2023 2.91 (L)    • Hemoglobin 03/09/2023 8.0 (L)    • Hematocrit 03/09/2023 25.0 (L)    • MCV 03/09/2023 85.9    • MCH 03/09/2023 27.6    • MCHC 03/09/2023 32.1    • RDW 03/09/2023 17.4 (H)    • RDW-SD 03/09/2023 52.1    • MPV 03/09/2023 8.0    • Platelets 03/09/2023 270    • Scan Slide 03/09/2023     • Neutrophil % 03/09/2023 70.0    • Lymphocyte % 03/09/2023 15.0 (L)    • Eosinophil % 03/09/2023 5.0    • Basophil % 03/09/2023 4.0 (H)    • Bands %  03/09/2023 6.0 (H)    • Neutrophils Absolute 03/09/2023 0.99 (L)    • Lymphocytes Absolute 03/09/2023 0.20 (L)    • Eosinophils Absolute 03/09/2023 0.07    • Basophils Absolute 03/09/2023 0.05    • Anisocytosis  03/09/2023 Slight/1+    • Poikilocytes 03/09/2023 Slight/1+    • Toxic Granulation 03/09/2023 Slight/1+    • Platelet Morphology 03/09/2023 Normal    Hospital Outpatient Visit on 02/02/2023   Component Date Value   • QT Interval 02/02/2023 344      Recent labs reviewed and interpreted for clinical significance and application            Level of Care:           Rm Styles MD  05/02/23  .

## 2023-05-04 RX ORDER — LISINOPRIL AND HYDROCHLOROTHIAZIDE 20; 12.5 MG/1; MG/1
1 TABLET ORAL DAILY
Qty: 90 TABLET | Refills: 1 | Status: SHIPPED | OUTPATIENT
Start: 2023-05-04

## 2023-05-04 RX ORDER — ATORVASTATIN CALCIUM 40 MG/1
40 TABLET, FILM COATED ORAL DAILY
Qty: 90 TABLET | Refills: 1 | Status: SHIPPED | OUTPATIENT
Start: 2023-05-04

## 2023-05-04 RX ORDER — ATORVASTATIN CALCIUM 40 MG/1
1 TABLET, FILM COATED ORAL DAILY
COMMUNITY
Start: 2023-04-28 | End: 2023-05-04 | Stop reason: SDUPTHER

## 2023-07-24 ENCOUNTER — OFFICE VISIT (OUTPATIENT)
Dept: CARDIOLOGY | Facility: CLINIC | Age: 73
End: 2023-07-24
Payer: MEDICARE

## 2023-07-24 VITALS
HEART RATE: 59 BPM | WEIGHT: 196 LBS | HEIGHT: 68 IN | DIASTOLIC BLOOD PRESSURE: 69 MMHG | RESPIRATION RATE: 18 BRPM | BODY MASS INDEX: 29.7 KG/M2 | SYSTOLIC BLOOD PRESSURE: 114 MMHG

## 2023-07-24 DIAGNOSIS — I10 PRIMARY HYPERTENSION: ICD-10-CM

## 2023-07-24 DIAGNOSIS — I10 ESSENTIAL HYPERTENSION: ICD-10-CM

## 2023-07-24 DIAGNOSIS — E78.5 HYPERLIPIDEMIA, UNSPECIFIED HYPERLIPIDEMIA TYPE: Primary | ICD-10-CM

## 2023-07-24 PROCEDURE — 3074F SYST BP LT 130 MM HG: CPT | Performed by: INTERNAL MEDICINE

## 2023-07-24 PROCEDURE — 3078F DIAST BP <80 MM HG: CPT | Performed by: INTERNAL MEDICINE

## 2023-07-24 PROCEDURE — 99214 OFFICE O/P EST MOD 30 MIN: CPT | Performed by: INTERNAL MEDICINE

## 2023-07-24 NOTE — PROGRESS NOTES
Cardiology Clinic Note  Rm Styles MD, PhD    Subjective:     Encounter Date:07/24/2023      Patient ID: Oscar Nash is a 72 y.o. male.    Chief Complaint:  Chief Complaint   Patient presents with    Follow-up     HPI:     I had the pleasure of seeing this 72-year-old patient who is well-known to me for 6-month follow-up.  2018 patient initially presented for typical anginal chest pain and dyspnea on exertion CCS class III and was taken to the Cath Lab for left heart cath and coronary angiography finding multivessel CAD ultimately getting 3 vessel coronary bypass graft with LIMA to LAD, SVG to OM1 and RCA and did well postoperatively.  He presents back to clinic today, weight is down almost 30 pounds, he was diagnosed with Hodgkin's lymphoma ultimately getting chemotherapy treatments.  He is very tired, blood pressures are much lower and he is off lisinopril HCTZ only on metoprolol.  He has dizziness episodes and signs of volume depletion with poor p.o. intake, no coronary symptomatology such as chest pain, he does have dyspnea on exertion.   He is status post bypass 2018 nearly 6 years ago, He is compliant with all medicines on aspirin, high intensity statin therapy and beta-blocker with preserved LV systolic function and no heart failure signs or symptoms.       He has repeat encounter today, appears much improved with better strength, he has gained back healthy weight, volume status is better, he has resumed his home antihypertensives lisinopril HCTZ and metoprolol, he is off midodrine and Florinef, he is up around 15 pounds with good healthy weight gain with better nutrition and oral intake  He denies any unstable heart failure signs or symptoms anginal chest pain syncope palpitations or other complaints today.  He is otherwise doing very well after cessation and completion of chemotherapy for lymphoma.      Review of systems negative except was mentioned above x14 point review of systems  Historical data  copied forward from previous encounters in EMR is unchanged     2D echo from April 2023 demonstrates normal EF 50 to 55% low normal, normal diastolic function, mild aortic valve stenosis only moderate calcification which is chronic, normal filling pressures estimated.        Vitals reviewed  114/69 heart rate 59, weight 196  Sternal clean and dry  Vitals reviewed  Regular rate and rhythm no rubs gallops heave or lift, 2 out of 6 systolic ejection murmur lower sternal border crescendo consistent with mild aortic stenosis, preserved S2, no changes  No clubbing cyanosis or edema  Intact grossly  Soft nontender nondistended  Clear to auscultation     Essential hypertension  Multivessel coronary disease status post bypass 2018  Essential hypertension hyperlipidemia  Hodgkin's lymphoma  Hypotension  Recent chemotherapy  Mild to moderate aortic valve stenosis unchanged        Plan today  Overall improved, blood pressure more stable, off midodrine and Florinef  Restarted lisinopril HCTZ and metoprolol with improving blood pressures with better nutrition and intake  Continue aspirin statin per guidelines            Activity and exercise regimen per AHA guidelines as tolerated by functional status     Murmur on exam consistent with mild to moderate aortic valve stenosis, echo demonstrates not more than mild to moderate aortic stenosis which is unchanged from prior encounter.  No intervention required, aspirin for life would be recommended     Patient improving status post chemotherapy for Hodgkin's lymphoma, from CAD standpoint we will pursue treadmill stress testing at a later date for risk stratification greater than 5 years after bypass now.     Otherwise feeling much more functional otherwise doing better, gaining healthy weight increasing activity without unstable symptoms        Back in 6 months       Historical data copied forward from previous encounters in EMR including the history, exam, and assessment/plan has been  "reviewed and is unchanged unless noted otherwise.    Cardiac medicines reviewed with risk, benefits, and necessity of each discussed.    Risk and benefit of cardiac testing reviewed including death heart attack stroke pain bleeding infection need for vascular /cardiovascular surgery were discussed and the patient     Objective:         /69 (BP Location: Right arm, Patient Position: Sitting)   Pulse 59   Resp 18   Ht 172.7 cm (68\")   Wt 88.9 kg (196 lb)   BMI 29.80 kg/m²     Physical Exam    Assessment:         There are no diagnoses linked to this encounter.       Plan:              The pleasure to be involved in this patient's cardiovascular care.  Please call with any questions or concerns  Rm Styles MD, PhD    Most recent EKG as reviewed and interpreted by me:  Procedures     Most recent echo as reviewed and interpreted by me:  Results for orders placed during the hospital encounter of 04/19/23    Adult Transthoracic Echo Complete W/ Cont if Necessary Per Protocol    Interpretation Summary    Left ventricular systolic function is low normal. Left ventricular ejection fraction appears to be 51 - 55%.    Left ventricular diastolic function was normal.    Mild aortic valve stenosis is present.    Estimated right ventricular systolic pressure from tricuspid regurgitation is normal (<35 mmHg).      Most recent stress test as reviewed and interpreted by me:      Most recent cardiac catheterization as reviewed interpreted by me:  No results found for this or any previous visit.    The following portions of the patient's history were reviewed and updated as appropriate: allergies, current medications, past family history, past medical history, past social history, past surgical history, and problem list.      ROS:  14 point review of systems negative except as mentioned above    Current Outpatient Medications:     aspirin 81 MG EC tablet, Take 1 tablet by mouth Daily., Disp: , Rfl:     atorvastatin " (LIPITOR) 40 MG tablet, Take 1 tablet by mouth Daily., Disp: 90 tablet, Rfl: 1    lisinopril-hydrochlorothiazide (PRINZIDE,ZESTORETIC) 20-12.5 MG per tablet, Take 1 tablet by mouth Daily., Disp: 90 tablet, Rfl: 1    metoprolol succinate XL (TOPROL-XL) 50 MG 24 hr tablet, Take 1 tablet by mouth Daily., Disp: 90 tablet, Rfl: 3    Problem List:  Patient Active Problem List   Diagnosis    HLD (hyperlipidemia)    HTN (hypertension)    Heart murmur    CAD (coronary artery disease)     Past Medical History:  Past Medical History:   Diagnosis Date    CAD (coronary artery disease)     HLD (hyperlipidemia) 5/21/2019    HTN (hypertension) 5/21/2019    Hyperlipidemia     Hypertension     Murmur, heart      Past Surgical History:  Past Surgical History:   Procedure Laterality Date    CORONARY ARTERY BYPASS GRAFT  07/2018    LIMA to LAD, SVGs to OM1 and RCA    HERNIA REPAIR      KNEE SURGERY Bilateral     ROTATOR CUFF REPAIR Bilateral     US GUIDED LYMPH NODE BIOPSY  9/26/2022     Social History:  Social History     Socioeconomic History    Marital status:    Tobacco Use    Smoking status: Never    Smokeless tobacco: Never   Substance and Sexual Activity    Alcohol use: Not Currently    Drug use: Not Currently    Sexual activity: Defer     Allergies:  Allergies   Allergen Reactions    Penicillins Unknown (See Comments) and Rash     .       Immunizations:  Immunization History   Administered Date(s) Administered    FLUAD TRI 65YR+ 10/12/2019    Flu Vaccine Quad PF >36MO 09/27/2016    Fluad Quad 65+ 09/28/2020    Hepatitis A 04/30/2018, 05/18/2019    Influenza, Unspecified 01/13/2013, 09/24/2015, 10/27/2016, 09/07/2017, 09/22/2018, 10/13/2019    Pneumococcal Conjugate 13-Valent (PCV13) 09/22/2018    Pneumococcal Polysaccharide (PPSV23) 10/12/2019, 10/13/2019    Shingrix 12/04/2020    Tdap 12/31/2014, 12/28/2015    Zostavax 08/31/2016, 09/27/2016            In-Office Procedure(s):  No orders to display        ASCVD RIsk  Score::  The ASCVD Risk score (Moy PARDO, et al., 2019) failed to calculate for the following reasons:    Cannot find a previous HDL lab    Cannot find a previous total cholesterol lab    Imaging:         Results for orders placed during the hospital encounter of 09/26/22    US Guided Lymph Node Biopsy    Narrative  DATE OF EXAM:  9/26/2022 2:26 PM    PROCEDURE:  US GUIDED LYMPH NODE BIOPSY-    INDICATIONS:  NECK AND UPPER CHEST ADENOPATHY; R59.1-Generalized enlarged lymph nodes    COMPARISON:  No Comparisons Available    FLUOROSCOPIC TIME:  None    PHYSICIAN MONITORED CONSCIOUS SEDATION TIME:  None minutes    TECHNIQUE:  After informed consent was obtained a timeout was performed. The  interventional radiology nurse monitored the patient at all times. The  patient was placed supine on the bed and the right neck was  ultrasounded, demonstrating multiple enlarged lymph nodes. An  appropriate access site was selected and the area was prepped and draped  in the usual sterile fashion. The skin was anesthetized with 1%  lidocaine and a skin incision was made. Next multiple passes were made  into an enlarged right cervical lymph node with an 18-gauge core biopsy  needle. A total of 5 specimens were obtained and given to the  pathologist noted the presence of diagnostic tissue. The patient  tolerated the procedure well without immediate complication.    FINDINGS:  See above    Impression  1. Successful ultrasound-guided biopsy of right cervical adenopathy.    Electronically Signed By-Rustam Bird MD On:9/26/2022 3:54 PM  This report was finalized on [id number] by  Rustam Bird MD.      Results for orders placed during the hospital encounter of 09/14/22    CT Soft Tissue Neck With Contrast    Narrative  CT SOFT TISSUE NECK W CONTRAST-    Date of Exam: 9/14/2022 11:47 AM    Indication: mass - right neck, firm/nontender.    Comparison Exams: None available.    Technique: Multiple axial images were obtained from the  skull base  through the aortic arch after the administration of IV contrast.  Automated exposure control and iterative reconstruction methods were  used.      FINDINGS:  The visualized intracranial contents appear within normal limits.    The globes and orbits are within normal limits.    Nasopharynx is normal.    Oropharynx including base of tongue and epiglottis appear within normal  limits.    Floor of mouth is normal.    Larynx is normal.    Visualized portions of the upper trachea and esophagus are normal.    Thyroid gland is normal.    Bilateral submandibular glands and parotid glands are normal.    There are multiple enlarged cervical, supraclavicular, and upper  mediastinal lymph nodes.  For example there is a right level 2 node  measuring 4.0 x 2.7 cm in size.  There is a right level 3 node measuring  4.3 x 3.2 cm.  There is a left supraclavicular node measuring 1.5 cm in  size.  There is an upper anterior mediastinal lymph node measuring 2.2 x  1.5 cm in size.  There are enlarged right supraclavicular nodes as well.      Bilateral carotid and vertebral arteries are patent without significant  stenosis. Bilateral internal jugular veins appear patent.    No suspicious lytic or sclerotic bony lesion identified.    Impression  1.  Multiple enlarged cervical and supraclavicular lymph nodes and upper  anterior mediastinal nodes.  Differential considerations would include  metastatic disease or lymphoma.  No definite primary neoplasm identified  within the neck.  Consider PET CT scan or CT scan of the chest, abdomen,  and pelvis to evaluate for primary neoplasm.  Alternatively percutaneous  biopsy could be performed of one of these enlarged lymph nodes.    Electronically Signed By-Neri Torres MD On:9/14/2022 12:04 PM  This report was finalized on [id number] by  Neri Torres MD.      Lab Review:   Hospital Outpatient Visit on 04/19/2023   Component Date Value    Target HR (85%) 04/19/2023 128     Max.  Pred. HR (100%) 04/19/2023 150     LVIDd 04/19/2023 4.2     LVIDs 04/19/2023 3.3     IVSd 04/19/2023 1.16     LVPWd 04/19/2023 1.31     FS 04/19/2023 22.1     IVS/LVPW 04/19/2023 0.89     ESV(cubed) 04/19/2023 35.1     LV Sys Vol (BSA correcte* 04/19/2023 19.3     EDV(cubed) 04/19/2023 74.1     LV Wei Vol (BSA correct* 04/19/2023 37.5     LVOT area 04/19/2023 3.0     LV mass(C)d 04/19/2023 185.5     LVOT diam 04/19/2023 1.97     EDV(MOD-sp4) 04/19/2023 72.2     ESV(MOD-sp4) 04/19/2023 37.1     SV(MOD-sp4) 04/19/2023 35.0     SI(MOD-sp4) 04/19/2023 18.2     EF(MOD-sp4) 04/19/2023 48.5     MV E max valerio 04/19/2023 85.7     MV A max valerio 04/19/2023 85.7     MV dec time 04/19/2023 0.22     MV E/A 04/19/2023 1.00     Pulm A Revs Dur 04/19/2023 0.09     SV(LVOT) 04/19/2023 71.9     RVIDd 04/19/2023 3.6     LA dimension (2D)  04/19/2023 4.4     Pulm Sys Valerio 04/19/2023 51.2     Pulm Wei Valerio 04/19/2023 40.1     Pulm S/D 04/19/2023 1.28     Pulm A Revs Valerio 04/19/2023 26.9     LV V1 max 04/19/2023 126.6     LV V1 max PG 04/19/2023 6.4     LV V1 mean PG 04/19/2023 3.3     LV V1 VTI 04/19/2023 23.6     Ao pk valerio 04/19/2023 191.7     Ao max PG 04/19/2023 14.7     Ao mean PG 04/19/2023 8.6     Ao V2 VTI 04/19/2023 39.6     LEE(I,D) 04/19/2023 1.81     MV max PG 04/19/2023 3.8     MV mean PG 04/19/2023 2.16     MV V2 VTI 04/19/2023 26.6     MVA(VTI) 04/19/2023 2.7     MV dec slope 04/19/2023 387.0     MR max valerio 04/19/2023 439.4     MR max PG 04/19/2023 77.5     TR max valerio 04/19/2023 229.8     TR max PG 04/19/2023 21.5     RVSP(TR) 04/19/2023 24.5     RAP systole 04/19/2023 3.0     RV V1 max PG 04/19/2023 3.4     RV V1 max 04/19/2023 92.0     RV V1 VTI 04/19/2023 18.4     PA V2 max 04/19/2023 176.0     PA acc time 04/19/2023 0.11     PA pr(Accel) 04/19/2023 30.2     Ao root diam 04/19/2023 3.2     ACS 04/19/2023 1.62     EF(MOD-bp) 04/19/2023 48.0     Echo EF Estimated 04/19/2023 55.0    Lab Requisition on 03/09/2023   Component  Date Value    WBC 03/09/2023 1.30 (C)     RBC 03/09/2023 2.91 (L)     Hemoglobin 03/09/2023 8.0 (L)     Hematocrit 03/09/2023 25.0 (L)     MCV 03/09/2023 85.9     MCH 03/09/2023 27.6     MCHC 03/09/2023 32.1     RDW 03/09/2023 17.4 (H)     RDW-SD 03/09/2023 52.1     MPV 03/09/2023 8.0     Platelets 03/09/2023 270     Scan Slide 03/09/2023      Neutrophil % 03/09/2023 70.0     Lymphocyte % 03/09/2023 15.0 (L)     Eosinophil % 03/09/2023 5.0     Basophil % 03/09/2023 4.0 (H)     Bands %  03/09/2023 6.0 (H)     Neutrophils Absolute 03/09/2023 0.99 (L)     Lymphocytes Absolute 03/09/2023 0.20 (L)     Eosinophils Absolute 03/09/2023 0.07     Basophils Absolute 03/09/2023 0.05     Anisocytosis 03/09/2023 Slight/1+     Poikilocytes 03/09/2023 Slight/1+     Toxic Granulation 03/09/2023 Slight/1+     Platelet Morphology 03/09/2023 Normal    Hospital Outpatient Visit on 02/02/2023   Component Date Value    QT Interval 02/02/2023 344      Recent labs reviewed and interpreted for clinical significance and application            Level of Care:           Rm Styles MD  07/24/23  .

## 2023-07-27 RX ORDER — METOPROLOL SUCCINATE 50 MG/1
TABLET, EXTENDED RELEASE ORAL
Qty: 90 TABLET | Refills: 0 | Status: SHIPPED | OUTPATIENT
Start: 2023-07-27

## 2023-11-20 RX ORDER — METOPROLOL SUCCINATE 50 MG/1
TABLET, EXTENDED RELEASE ORAL
Qty: 90 TABLET | Refills: 3 | Status: SHIPPED | OUTPATIENT
Start: 2023-11-20

## 2024-12-03 RX ORDER — METOPROLOL SUCCINATE 50 MG/1
TABLET, EXTENDED RELEASE ORAL
Qty: 90 TABLET | Refills: 0 | OUTPATIENT
Start: 2024-12-03